# Patient Record
Sex: FEMALE | Race: OTHER | Employment: UNEMPLOYED | URBAN - METROPOLITAN AREA
[De-identification: names, ages, dates, MRNs, and addresses within clinical notes are randomized per-mention and may not be internally consistent; named-entity substitution may affect disease eponyms.]

---

## 2021-03-11 ENCOUNTER — HOSPITAL ENCOUNTER (OUTPATIENT)
Dept: LAB | Age: 41
Discharge: HOME | End: 2021-03-11

## 2021-03-11 PROCEDURE — 87624 HPV HI-RISK TYP POOLED RSLT: CPT

## 2021-03-11 PROCEDURE — 88175 CYTOPATH C/V AUTO FLUID REDO: CPT

## 2022-07-22 ENCOUNTER — HOSPITAL ENCOUNTER (OUTPATIENT)
Dept: PHYSICAL THERAPY | Age: 42
Discharge: HOME | End: 2022-07-22

## 2022-07-22 PROCEDURE — 97140 MANUAL THERAPY 1/> REGIONS: CPT | Performed by: PHYSICAL THERAPIST

## 2022-07-22 PROCEDURE — 97162 PT EVAL MOD COMPLEX 30 MIN: CPT | Performed by: PHYSICAL THERAPIST

## 2022-07-22 PROCEDURE — 97110 THERAPEUTIC EXERCISES: CPT | Performed by: PHYSICAL THERAPIST

## 2022-07-22 NOTE — THERAPY EVALUATION
Physical Therapy at [facility],   a part of  [facility]  [facility], [address]  Phone: [phone]  Fax: [phone]    Plan of Care/ Statement of Necessity for Physical Therapy Services 2-15    Patient name: Deb Gamble  : 1980  Provider#: [id number]  Referral source: Sky Choi MD      Medical/Treatment Diagnosis: Neck pain [M54.2]     Prior Hospitalization: see medical history     Comorbidities: None  Prior Level of Function: see initial eval  Medications: Verified on Patient Summary List    Start of Care: 2022      Onset Date:        The Plan of Care and following information is based on the information from the initial evaluation. Assessment/ key information: Patient presents with chronic L TMJ, neck, and low back pain with significant loss of ROM at all sites. Evaluation Complexity History MEDIUM  Complexity : 1-2 comorbidities / personal factors will impact the outcome/ POC ; Examination MEDIUM Complexity : 3 Standardized tests and measures addressing body structure, function, activity limitation and / or participation in recreation  ;Presentation MEDIUM Complexity : Evolving with changing characteristics  ; Clinical Decision Making MEDIUM Complexity : FOTO score of 26-74  Overall Complexity Rating: MEDIUM    Problem List: pain affecting function, decrease ROM, decrease strength, decrease ADL/ functional abilitiies, decrease activity tolerance, and decrease flexibility/ joint mobility   Treatment Plan may include any combination of the following: Therapeutic exercise, Therapeutic activities, Neuromuscular re-education, Physical agent/modality, Gait/balance training, Manual therapy, Patient education, Self Care training, Functional mobility training, Home safety training, and Other: dry needling  Patient / Family readiness to learn indicated by: asking questions, trying to perform skills, and interest  Persons(s) to be included in education: patient (P)  Barriers to Learning/Limitations: None  Patient Goal (s): I want to be able to chew without pain.   Patient Self Reported Health Status: good  Rehabilitation Potential: good    Short Term Goals: To be accomplished in 4 weeks:  Patient will be able to demonstrate at least 30 mm of mandibular opening without an audible click. Patient will be able to bend forward and tie her shoes with <5/10 low back pain. Patient will be able to demonstrate at least 30 degrees of cervical flexion AROM with <5/10 neck pain  Long Term Goals: To be accomplished in 12 weeks:  Patient will be able to demonstrate at 40 mm of mandibular opening without pain. Patient will be able to  a 10# objects from the floor without low back pain. Patient will be able to demonstrate >60 degrees of AROM cervical rotation to either direction without pain. Frequency / Duration: Patient to be seen 2 times per week for 12 weeks. Patient/ Caregiver education and instruction: self care, activity modification, and exercises    [x]  Plan of care has been reviewed with PTA    Kiara Agee, PT 7/22/2022     ________________________________________________________________________    I certify that the above Therapy Services are being furnished while the patient is under my care. I agree with the treatment plan and certify that this therapy is necessary.     Physician's Signature:____________________  Date:____________Time: _________      Junie Pandya MD

## 2022-07-22 NOTE — PROGRESS NOTES
PT INITIAL EVALUATION NOTE 2-15    Patient Name: Essence Pemberton  Date:2022  : 1980  [x]  Patient  Verified  Payor: [address] [place] [address] / Plan: [facility] / Product Type: Samanta /    In time:10:20 AM  Out time:11:20 AM  Total Treatment Time (min): 60  Visit #: 1     Treatment Area: Neck pain [M54.2]    SUBJECTIVE  Pain Level (0-10 scale): 10/10  Any medication changes, allergies to medications, adverse drug reactions, diagnosis change, or new procedure performed?: [] No    [x] Yes (see summary sheet for update)  Subjective:     L TMJ, neck, back pain  PLOF: No limitations with bending forward, turning head, chewing food  Mechanism of Injury: Insidious onset 3 years ago  Previous Treatment/Compliance: She has been told that her chronic back pain is arthritis. She saw her PCP one month ago and he referred her to PT. PMHx/Surgical Hx: No other PMH  Work Hx: n/a  Living Situation: lives in a two story home with family  Pt Goals: to reduce pain and improve mobility  Barriers: chronicity  Motivation: motivated  Substance use: none   Cognition: A & O x 4        OBJECTIVE/EXAMINATION  TMJ Evaluation    History  Chief Complaint: chewing, yawning  Pain Location: L TMJ   Constant vs intermittent: constant   Pain increases with: opening   Pain decreases with: rest  Onset (insidious vs trauma): insidious  Difficulty opening mouth?: yes  Clicking or popping?: yes  Jaw locking?: no  Pain with chewing, yawning, wide opening?: yes  Pain in ears/cheeks or ringing in ears?: no  Any injury to jaw, head or neck?: chronic neck pain  On hard or soft diet?: soft  Arthritis?: no  Previous history of TMJ problems?: no    Objective  TMJ resting position (toungue behind teeth with slightly apart): no  Mouth vs Nose Breathing: mouth  Posture: forward head  C-spine ROM:   Flexion: 30  Extension: 30  Rotation: R: 30 L: 45  SB: R: 30 L 30  C-spine strentgh: WNL  U/E ROM: WNL  U/E Strength:  WNL  Mandible ROM   (R) lateral deviation: 10   (L) lateral deviation: 20   Opening (4-5cm or 2 of pt's fingers=normal): 30  Resisted mandible motions: NT  Loading of TMJ (push posterior): NT  Dental Roll Bite   (R) roll: NT   (L) roll: NT  Palpation:   Posterior capsule (in ear, mouth open): TTP   Lateral capsule (external): TTP   Masseter: TTP   Pterygoids: NT   SCM: No   Scalenes: No   Suboccipital: TTP   Trapezius: TTP      Modality rationale: decrease pain and increase tissue extensibility to improve the patients ability to bend forward and chew food without pain   Min Type Additional Details    [] Estim: []Att   []Unatt        []TENS instruct                  []IFC  []Premod   []NMES                     []Other:  []w/US   []w/ice   []w/heat  Position:  Location:    []  Traction: [] Cervical       []Lumbar                       [] Prone          []Supine                       []Intermittent   []Continuous Lbs:  [] before manual  [] after manual  []w/heat    []  Ultrasound: []Continuous   [] Pulsed at:                            []1MHz   []3MHz Location:  W/cm2:    []  Paraffin         Location:  []w/heat   10 []  Ice     [x]  Heat  []  Ice massage Position: supine  Location: low back, neck, TMJ    []  Laser  []  Other: Position:  Location:    []  Vasopneumatic Device Pressure:       [] lo [] med [] hi   Temperature:    [x] Skin assessment post-treatment:  [x]intact []redness- no adverse reaction    []redness - adverse reaction:     10 min Therapeutic Exercise:  [x] See flow sheet :   Rationale: increase ROM, increase strength, and improve coordination to improve the patients ability to bend forward and chew without pain    15 min Manual Therapy:    Manual cervical traction  Suboccipital release  Grade III P/A mobilizations to entire c-spine  Passive stretching of B UT/LS   Rationale: decrease pain, increase ROM, and increase tissue extensibility  to improve the patients ability to bend forward and chew food without pain      With [] TE   [] TA   [] Neuro   [] SC   [] other: Patient Education: [x] Review HEP    [] Progressed/Changed HEP based on:   [] positioning   [] body mechanics   [] transfers   [] heat/ice application    [] other:        Other Objective/Functional Measures:   Pain Level (0-10 scale) post treatment: 5/10      ASSESSMENT:      [x]  See Plan of Chalo Martin, PT 7/22/2022

## 2022-07-26 ENCOUNTER — HOSPITAL ENCOUNTER (OUTPATIENT)
Dept: PHYSICAL THERAPY | Age: 42
Discharge: HOME | End: 2022-07-26

## 2022-07-26 PROCEDURE — 97110 THERAPEUTIC EXERCISES: CPT

## 2022-07-26 PROCEDURE — 97140 MANUAL THERAPY 1/> REGIONS: CPT

## 2022-07-26 NOTE — PROGRESS NOTES
PT DAILY TREATMENT NOTE 2-15    Patient Name: Orville Lara  Date:2022  : 1980  [x]  Patient  Verified  Payor: [address] [place] [address] / Plan: [facility] / Product Type: Erle Joyce /    In time: 11:00a  Out time: 12:00p  Total Treatment Time (min): 60  Visit #:  2    Treatment Area: Neck pain [M54.2]    SUBJECTIVE  Pain Level (0-10 scale): 7-8  Any medication changes, allergies to medications, adverse drug reactions, diagnosis change, or new procedure performed?: [x] No    [] Yes (see summary sheet for update)  Subjective functional status/changes:   [] No changes reported  Patient reports she has been performing the HEP, but has pain while doing them.     OBJECTIVE    Modality rationale: decrease pain and increase tissue extensibility to improve the patients ability to bend forwards and chew food without pain   Min Type Additional Details       [] Estim: []Att   []Unatt    []TENS instruct                  []IFC  []Premod   []NMES                     []Other:  []w/US   []w/ice   []w/heat  Position:  Location:       []  Traction: [] Cervical       []Lumbar                       [] Prone          []Supine                       []Intermittent   []Continuous Lbs:  [] before manual  [] after manual  []w/heat    []  Ultrasound: []Continuous   [] Pulsed                       at: []1MHz   []3MHz Location:  W/cm2:    [] Paraffin         Location:   []w/heat   5 []  Ice     [x]  Heat  []  Ice massage Position: supine  Location:back, neck, TMJ    []  Laser  []  Other: Position:  Location:      []  Vasopneumatic Device Pressure:       [] lo [] med [] hi   Temperature:      [x] Skin assessment post-treatment:  [x]intact []redness- no adverse reaction    []redness - adverse reaction:         35 min Therapeutic Exercise:  [x] See flow sheet :   Rationale: increase ROM to improve the patients ability to bend forward, look over shoulder and chew without pain    20 min Manual Therapy:  gentle MFR L SCM, masseter, UT, cervical paraspinals   Rationale: decrease pain, increase ROM, increase tissue extensibility, and decrease trigger points  to improve the patients ability to bend forward, look over shoulder and chew without pain          With   [] TE   [] TA   [] Neuro   [] SC   [] other: Patient Education: [x] Review HEP    [] Progressed/Changed HEP based on:   [] positioning   [] body mechanics   [] transfers   [] heat/ice application    [] other:      Other Objective/Functional Measures: TTP L SCM and UT, decreased tissue turgor masseter and SCM post manual    Pt reports improved symptoms after manual today     Pain Level (0-10 scale) post treatment: 4    ASSESSMENT/Changes in Function:   Patient requires min to mod verbal cues for pain free ROM and exercise performance. Patient unable to tolerate UT and levator stretch on right secondary to increased pain felt on left, good tolerance and decreased pain when left stretching. Patient will continue to benefit from skilled PT services to modify and progress therapeutic interventions, address functional mobility deficits, address ROM deficits, analyze and address soft tissue restrictions, and analyze and cue movement patterns to attain remaining goals. []  See Plan of Care  []  See progress note/recertification  []  See Discharge Summary         Progress towards goals / Updated goals:  Patient with good tolerance for all interventions with some relief after manual. Patient making slow progress towards goals at this time and may benefit from addition of dry needling if minimal progress seen over next several visits.     PLAN  [x]  Upgrade activities as tolerated     [x]  Continue plan of care  [x]  Update interventions per flow sheet       []  Discharge due to:_  []  Other:_      Sam Mariscla, TATI 7/26/2022

## 2022-07-29 ENCOUNTER — HOSPITAL ENCOUNTER (OUTPATIENT)
Dept: PHYSICAL THERAPY | Age: 42
Discharge: HOME | End: 2022-07-29

## 2022-07-29 PROCEDURE — 97110 THERAPEUTIC EXERCISES: CPT | Performed by: PHYSICAL MEDICINE & REHABILITATION

## 2022-07-29 PROCEDURE — 97140 MANUAL THERAPY 1/> REGIONS: CPT | Performed by: PHYSICAL MEDICINE & REHABILITATION

## 2022-07-29 NOTE — PROGRESS NOTES
PT DAILY TREATMENT NOTE 2-15    Patient Name: Emily Jerez  Date:2022  : 1980  [x]  Patient  Verified  Payor: [address] [place] [address] / Plan: [facility] / Product Type: Dori Kobs /    In time: 10:00a  Out time: 11:00a  Total Treatment Time (min): 60  Visit #:  3    Treatment Area: Neck pain [M54.2]    SUBJECTIVE  Pain Level (0-10 scale): 7  Any medication changes, allergies to medications, adverse drug reactions, diagnosis change, or new procedure performed?: [x] No    [] Yes (see summary sheet for update)  Subjective functional status/changes:   [] No changes reported  Patient reports she has been performing the HEP, but has pain while doing them.     OBJECTIVE    Modality rationale: decrease pain and increase tissue extensibility to improve the patients ability to bend forwards and chew food without pain   Min Type Additional Details       [] Estim: []Att   []Unatt    []TENS instruct                  []IFC  []Premod   []NMES                     []Other:  []w/US   []w/ice   []w/heat  Position:  Location:       []  Traction: [] Cervical       []Lumbar                       [] Prone          []Supine                       []Intermittent   []Continuous Lbs:  [] before manual  [] after manual  []w/heat    []  Ultrasound: []Continuous   [] Pulsed                       at: []1MHz   []3MHz Location:  W/cm2:    [] Paraffin         Location:   []w/heat   10 []  Ice     [x]  Heat  []  Ice massage Position: supine  Location:back, neck, TMJ    []  Laser  []  Other: Position:  Location:      []  Vasopneumatic Device Pressure:       [] lo [] med [] hi   Temperature:      [x] Skin assessment post-treatment:  [x]intact []redness- no adverse reaction    []redness - adverse reaction:         35 min Therapeutic Exercise:  [x] See flow sheet :   Rationale: increase ROM to improve the patients ability to bend forward, look over shoulder and chew without pain    15 min Manual Therapy:  SOR, Cervical traction, UT stretch, TPR to L UT/Lev   Rationale: decrease pain, increase ROM, increase tissue extensibility, and decrease trigger points  to improve the patients ability to bend forward, look over shoulder and chew without pain          With   [] TE   [] TA   [] Neuro   [] SC   [] other: Patient Education: [x] Review HEP    [] Progressed/Changed HEP based on:   [] positioning   [] body mechanics   [] transfers   [] heat/ice application    [] other:      Other Objective/Functional Measures:   Max TTP along along L UT/Lev and L suboccipital    Pain Level (0-10 scale) post treatment: 5    ASSESSMENT/Changes in Function:     Patient will continue to benefit from skilled PT services to modify and progress therapeutic interventions, address functional mobility deficits, address ROM deficits, analyze and address soft tissue restrictions, and analyze and cue movement patterns to attain remaining goals. []  See Plan of Care  []  See progress note/recertification  []  See Discharge Summary         Progress towards goals / Updated goals:  Patient is making slow progress towards goals due to high levels of pain at multiple sites. Will continue to progress slowly as tolerated.     PLAN  [x]  Upgrade activities as tolerated     [x]  Continue plan of care  [x]  Update interventions per flow sheet       []  Discharge due to:_  []  Other:_      Argelia Forward, PTA, OPTA, CPT  7/29/2022

## 2022-08-01 ENCOUNTER — HOSPITAL ENCOUNTER (EMERGENCY)
Age: 42
Discharge: HOME | End: 2022-08-01
Attending: STUDENT IN AN ORGANIZED HEALTH CARE EDUCATION/TRAINING PROGRAM

## 2022-08-01 ENCOUNTER — APPOINTMENT (OUTPATIENT)
Dept: GENERAL RADIOLOGY | Age: 42
End: 2022-08-01
Attending: STUDENT IN AN ORGANIZED HEALTH CARE EDUCATION/TRAINING PROGRAM

## 2022-08-01 VITALS
WEIGHT: 114 LBS | RESPIRATION RATE: 16 BRPM | BODY MASS INDEX: 21.52 KG/M2 | DIASTOLIC BLOOD PRESSURE: 91 MMHG | TEMPERATURE: 97.7 F | HEIGHT: 61 IN | OXYGEN SATURATION: 100 % | HEART RATE: 69 BPM | SYSTOLIC BLOOD PRESSURE: 150 MMHG

## 2022-08-01 DIAGNOSIS — R07.9 CHEST PAIN, UNSPECIFIED TYPE: Primary | ICD-10-CM

## 2022-08-01 DIAGNOSIS — R03.0 ELEVATED BLOOD PRESSURE READING: ICD-10-CM

## 2022-08-01 LAB
ALBUMIN SERPL-MCNC: 3.8 G/DL (ref 3.5–5)
ALBUMIN/GLOB SERPL: 0.8 {RATIO} (ref 1.1–2.2)
ALP SERPL-CCNC: 106 U/L (ref 45–117)
ALT SERPL-CCNC: 41 U/L (ref 12–78)
ANION GAP SERPL CALC-SCNC: 4 MMOL/L (ref 5–15)
AST SERPL-CCNC: 34 U/L (ref 15–37)
BASOPHILS # BLD: 0 K/UL (ref 0–0.1)
BASOPHILS NFR BLD: 0 % (ref 0–1)
BILIRUB SERPL-MCNC: 0.5 MG/DL (ref 0.2–1)
BUN SERPL-MCNC: 6 MG/DL (ref 6–20)
BUN/CREAT SERPL: 10 (ref 12–20)
CALCIUM SERPL-MCNC: 9 MG/DL (ref 8.5–10.1)
CHLORIDE SERPL-SCNC: 110 MMOL/L (ref 97–108)
CO2 SERPL-SCNC: 24 MMOL/L (ref 21–32)
CREAT SERPL-MCNC: 0.6 MG/DL (ref 0.55–1.02)
DIFFERENTIAL METHOD BLD: ABNORMAL
EOSINOPHIL # BLD: 0 K/UL (ref 0–0.4)
EOSINOPHIL NFR BLD: 0 % (ref 0–7)
ERYTHROCYTE [DISTWIDTH] IN BLOOD BY AUTOMATED COUNT: 14 % (ref 11.5–14.5)
GLOBULIN SER CALC-MCNC: 5 G/DL (ref 2–4)
GLUCOSE SERPL-MCNC: 111 MG/DL (ref 65–100)
HCT VFR BLD AUTO: 39.5 % (ref 35–47)
HGB BLD-MCNC: 12.5 G/DL (ref 11.5–16)
IMM GRANULOCYTES # BLD AUTO: 0 K/UL (ref 0–0.04)
IMM GRANULOCYTES NFR BLD AUTO: 0 % (ref 0–0.5)
LYMPHOCYTES # BLD: 0.7 K/UL (ref 0.8–3.5)
LYMPHOCYTES NFR BLD: 9 % (ref 12–49)
MCH RBC QN AUTO: 26.3 PG (ref 26–34)
MCHC RBC AUTO-ENTMCNC: 31.6 G/DL (ref 30–36.5)
MCV RBC AUTO: 83.2 FL (ref 80–99)
MONOCYTES # BLD: 0.1 K/UL (ref 0–1)
MONOCYTES NFR BLD: 1 % (ref 5–13)
NEUTS SEG # BLD: 7.2 K/UL (ref 1.8–8)
NEUTS SEG NFR BLD: 90 % (ref 32–75)
NRBC # BLD: 0 K/UL (ref 0–0.01)
NRBC BLD-RTO: 0 PER 100 WBC
PLATELET # BLD AUTO: 329 K/UL (ref 150–400)
PMV BLD AUTO: 10.6 FL (ref 8.9–12.9)
POTASSIUM SERPL-SCNC: 3.3 MMOL/L (ref 3.5–5.1)
PROT SERPL-MCNC: 8.8 G/DL (ref 6.4–8.2)
RBC # BLD AUTO: 4.75 M/UL (ref 3.8–5.2)
RBC MORPH BLD: ABNORMAL
SODIUM SERPL-SCNC: 138 MMOL/L (ref 136–145)
TROPONIN-HIGH SENSITIVITY: <4 NG/L (ref 0–51)
WBC # BLD AUTO: 8 K/UL (ref 3.6–11)

## 2022-08-01 PROCEDURE — 71046 X-RAY EXAM CHEST 2 VIEWS: CPT

## 2022-08-01 PROCEDURE — 36415 COLL VENOUS BLD VENIPUNCTURE: CPT

## 2022-08-01 PROCEDURE — 84484 ASSAY OF TROPONIN QUANT: CPT

## 2022-08-01 PROCEDURE — 85025 COMPLETE CBC W/AUTO DIFF WBC: CPT

## 2022-08-01 PROCEDURE — 99285 EMERGENCY DEPT VISIT HI MDM: CPT

## 2022-08-01 PROCEDURE — 93005 ELECTROCARDIOGRAM TRACING: CPT

## 2022-08-01 PROCEDURE — 80053 COMPREHEN METABOLIC PANEL: CPT

## 2022-08-01 NOTE — ED TRIAGE NOTES
Patient with CP x3 days with pain radiating down left arm today, sent by [facility] for HTN in office and CP.      1 nitro and 324 Aspirin given by EMS     Patient is Slovenian speaking

## 2022-08-01 NOTE — ED PROVIDER NOTES
Pt with PMHx of GERD, Anxiety and Lumbar radiculopathy. Brought by EMS d/t high BP and CP while she was at [facility] where she got an injection for back pain. Pt mentioned she has been experiencing CP on exertion for the past few months. This is localized in L side, 7/10 in intensity, mainly while doing home shores. Dull type, radiate to L arm and is accompanied by lightlessness, palpitations and blurred vision. Resolved by resting. She mentioned she was not anxious today and has received those injections before. Seems like high BP was before the shot. Denies fever, N/V, no abdominal pain. EMS gave her Nitro x1 and  mgx 1, mentioned it helped. No past medical history on file. No past surgical history on file. No family history on file. Social History     Socioeconomic History    Marital status: UNKNOWN     Spouse name: Not on file    Number of children: Not on file    Years of education: Not on file    Highest education level: Not on file   Occupational History    Not on file   Tobacco Use    Smoking status: Not on file    Smokeless tobacco: Not on file   Substance and Sexual Activity    Alcohol use: Not on file    Drug use: Not on file    Sexual activity: Not on file   Other Topics Concern    Not on file   Social History Narrative    Not on file     Social Determinants of Health     Financial Resource Strain: Not on file   Food Insecurity: Not on file   Transportation Needs: Not on file   Physical Activity: Not on file   Stress: Not on file   Social Connections: Not on file   Intimate Partner Violence: Not on file   Housing Stability: Not on file         ALLERGIES: Penicillins    Review of Systems   Constitutional:  Negative for fatigue and fever. HENT:  Positive for sinus pressure. Cardiovascular:  Positive for chest pain and palpitations. Negative for leg swelling. Gastrointestinal:  Negative for abdominal pain. Genitourinary:  Negative for dysuria and flank pain. Musculoskeletal:  Positive for back pain. Skin:  Negative for pallor. Neurological:  Positive for light-headedness. Negative for syncope. Psychiatric/Behavioral:  Negative for confusion. Vitals:    08/01/22 1300 08/01/22 1427   BP: (!) 155/75 (!) 150/91   Pulse: 69 69   Resp: 18 16   Temp: 97.7 °F (36.5 °C)    SpO2: 99% 100%   Weight: 51.7 kg (114 lb)    Height: 5' 1\" (1.549 m)             Physical Exam  Constitutional:       General: She is not in acute distress. HENT:      Head: Normocephalic and atraumatic. Eyes:      Extraocular Movements: Extraocular movements intact. Cardiovascular:      Rate and Rhythm: Normal rate and regular rhythm. Heart sounds: Normal heart sounds. No murmur heard. Pulmonary:      Breath sounds: Normal breath sounds. No wheezing, rhonchi or rales. Chest:      Chest wall: No tenderness. Abdominal:      General: Bowel sounds are normal.      Palpations: Abdomen is soft. Musculoskeletal:         General: Normal range of motion. Cervical back: Normal range of motion. Skin:     General: Skin is warm and dry. Capillary Refill: Capillary refill takes less than 2 seconds. Neurological:      General: No focal deficit present. Mental Status: She is alert. Psychiatric:         Mood and Affect: Mood normal.        MDM  Number of Diagnoses or Management Options  Diagnosis management comments: Pt symptoms may possibly be related to MSK in nature or undiagnosed HTN or arrhythmia. Heart Score low. ACS work up negative and CXR, CBC and CMP unremarkable. Pt has remain stable and w/o distress during this ED visit. Will sent home w/ close follow up w/ PCP to monitor BP and consider treatment if appropriate. All above discussed w/ pt who is agreeable. Pt seen and discussed w/ Dr. Gillespie (Attending Physician).               Procedures

## 2022-08-02 LAB
ATRIAL RATE: 68 BPM
CALCULATED P AXIS, ECG09: 51 DEGREES
CALCULATED R AXIS, ECG10: -16 DEGREES
CALCULATED T AXIS, ECG11: -21 DEGREES
DIAGNOSIS, 93000: NORMAL
P-R INTERVAL, ECG05: 122 MS
Q-T INTERVAL, ECG07: 400 MS
QRS DURATION, ECG06: 82 MS
QTC CALCULATION (BEZET), ECG08: 425 MS
VENTRICULAR RATE, ECG03: 68 BPM

## 2022-08-05 ENCOUNTER — APPOINTMENT (OUTPATIENT)
Dept: PHYSICAL THERAPY | Age: 42
End: 2022-08-05

## 2022-08-10 ENCOUNTER — HOSPITAL ENCOUNTER (OUTPATIENT)
Dept: PHYSICAL THERAPY | Age: 42
Discharge: HOME | End: 2022-08-10

## 2022-08-10 PROCEDURE — 97140 MANUAL THERAPY 1/> REGIONS: CPT | Performed by: PHYSICAL THERAPIST

## 2022-08-10 PROCEDURE — 97110 THERAPEUTIC EXERCISES: CPT | Performed by: PHYSICAL THERAPIST

## 2022-08-10 NOTE — PROGRESS NOTES
PT DAILY TREATMENT NOTE 2-15    Patient Name: Leilani Love  Date:8/10/2022  : 1980  [x]  Patient  Verified  Payor: [address] [place] [address] / Plan: [facility] / Product Type: Betina Poet /    In time: 12:00p Out time: 12:55p  Total Treatment Time (min): 55  Visit #:  4    Treatment Area: Neck pain [M54.2]    SUBJECTIVE  Pain Level (0-10 scale): 6  Any medication changes, allergies to medications, adverse drug reactions, diagnosis change, or new procedure performed?: [x] No    [] Yes (see summary sheet for update)  Subjective functional status/changes:   [] No changes reported  Patient returns to the clinic following a 2 week absence due to other health complications. Her BP was very elevated for about a week and she went to a local ER. They cleared her of any cardiac issues and also gave an injection to the lumbar spine. That helped significantly and she has little back pain. Her L TMJ and neck pain is about the same as on her last visit. She finds that the [address] helps, but she still has difficulty turning her head in either direction.     OBJECTIVE    Modality rationale: decrease pain and increase tissue extensibility to improve the patients ability to bend forwards and chew food without pain   Min Type Additional Details       [] Estim: []Att   []Unatt    []TENS instruct                  []IFC  []Premod   []NMES                     []Other:  []w/US   []w/ice   []w/heat  Position:  Location:       []  Traction: [] Cervical       []Lumbar                       [] Prone          []Supine                       []Intermittent   []Continuous Lbs:  [] before manual  [] after manual  []w/heat    []  Ultrasound: []Continuous   [] Pulsed                       at: []1MHz   []3MHz Location:  W/cm2:    [] Paraffin         Location:   []w/heat   10 []  Ice     [x]  Heat  []  Ice massage Position: supine  Location:back, neck, TMJ    []  Laser  []  Other: Position:  Location:      []  Vasopneumatic Device Pressure:       [] lo [] med [] hi   Temperature:      [x] Skin assessment post-treatment:  [x]intact []redness- no adverse reaction    []redness - adverse reaction:         25 min Therapeutic Exercise:  [x] See flow sheet :   Rationale: increase ROM to improve the patients ability to bend forward, look over shoulder and chew without pain    20 min Manual Therapy:    SOR  TPR to L SCM, scalenes, L masseter  Grade III rotary mobilizations to C2-C4   Rationale: decrease pain, increase ROM, increase tissue extensibility, and decrease trigger points  to improve the patients ability to bend forward, look over shoulder and chew without pain          With   [] TE   [] TA   [] Neuro   [] SC   [] other: Patient Education: [x] Review HEP    [] Progressed/Changed HEP based on:   [] positioning   [] body mechanics   [] transfers   [] heat/ice application    [] other:      Other Objective/Functional Measures:   Continues to be very TTP along anterior neck musculature on L  Strong pain relief with mobilizations to L upper c-spine    Pain Level (0-10 scale) post treatment: 3, \"better\"    ASSESSMENT/Changes in Function:     Patient will continue to benefit from skilled PT services to modify and progress therapeutic interventions, address functional mobility deficits, address ROM deficits, analyze and address soft tissue restrictions, and analyze and cue movement patterns to attain remaining goals. []  See Plan of Care  []  See progress note/recertification  []  See Discharge Summary         Progress towards goals / Updated goals:  Slow overall progress towards functional goals due to patient's PMH and significant pain levels. PLAN  [x]  Upgrade activities as tolerated     [x]  Continue plan of care  [x]  Update interventions per flow sheet       []  Discharge due to:_  []  Other:_      Olayinka Pendleton, PT , DPT, OCS, Cert.  DN   8/10/2022

## 2022-08-12 ENCOUNTER — HOSPITAL ENCOUNTER (OUTPATIENT)
Dept: PHYSICAL THERAPY | Age: 42
Discharge: HOME | End: 2022-08-12

## 2022-08-12 PROCEDURE — 97110 THERAPEUTIC EXERCISES: CPT

## 2022-08-12 PROCEDURE — 97140 MANUAL THERAPY 1/> REGIONS: CPT

## 2022-08-12 NOTE — PROGRESS NOTES
PT DAILY TREATMENT NOTE 2-15    Patient Name: Molly Walker  Date:2022  : 1980  [x]  Patient  Verified  Payor: [address] [place] [address] / Plan: [facility] / Product Type: Melody Graham /    In time: 11:10a  Out time: 12:00  Total Treatment Time (min): 50  Visit #:  5    Treatment Area: Neck pain [M54.2]    SUBJECTIVE  Pain Level (0-10 scale): 5  Any medication changes, allergies to medications, adverse drug reactions, diagnosis change, or new procedure performed?: [x] No    [] Yes (see summary sheet for update)  Subjective functional status/changes:   [] No changes reported  Patient states pain in neck is a \"little bit less than before. \" Patient reports no LBP this session, but injection in lower back makes her L leg feel numb.      OBJECTIVE    Modality rationale: decrease pain and increase tissue extensibility to improve the patients ability to bend forwards and chew food without pain   Min Type Additional Details       [] Estim: []Att   []Unatt    []TENS instruct                  []IFC  []Premod   []NMES                     []Other:  []w/US   []w/ice   []w/heat  Position:  Location:       []  Traction: [] Cervical       []Lumbar                       [] Prone          []Supine                       []Intermittent   []Continuous Lbs:  [] before manual  [] after manual  []w/heat    []  Ultrasound: []Continuous   [] Pulsed                       at: []1MHz   []3MHz Location:  W/cm2:    [] Paraffin         Location:   []w/heat   10 []  Ice     [x]  Heat  []  Ice massage Position: supine  Location: back, neck, TMJ    []  Laser  []  Other: Position:  Location:      []  Vasopneumatic Device Pressure:       [] lo [] med [] hi   Temperature:      [x] Skin assessment post-treatment:  [x]intact []redness- no adverse reaction    []redness - adverse reaction:         25 min Therapeutic Exercise:  [] See flow sheet :   Rationale: increase ROM to improve the patients ability to bend forward ,look over shoulder and chew without pain    15 min Manual Therapy:  SOR, C/S traction, UT stretch, TPR L UT, Grade III C2-C4 mobs   Rationale: decrease pain, increase ROM, increase tissue extensibility, and decrease trigger points  to improve the patients ability to bend forward, look over shoulder and chew without pain        With   [] TE   [] TA   [] Neuro   [] SC   [] other: Patient Education: [x] Review HEP    [] Progressed/Changed HEP based on:   [] positioning   [] body mechanics   [] transfers   [] heat/ice application    [] other:      Other Objective/Functional Measures:    TTP  L UT    Pain Level (0-10 scale) post treatment: 3    ASSESSMENT/Changes in Function:     Patient will continue to benefit from skilled PT services to modify and progress therapeutic interventions, address functional mobility deficits, address ROM deficits, analyze and address soft tissue restrictions, analyze and cue movement patterns, analyze and modify body mechanics/ergonomics, and assess and modify postural abnormalities to attain remaining goals. [x]  See Plan of Care  []  See progress note/recertification  []  See Discharge Summary         Progress towards goals / Updated goals:  Patient with limited progress towards goals this session secondary to pain.      PLAN  []  Upgrade activities as tolerated     []  Continue plan of care  []  Update interventions per flow sheet       []  Discharge due to:_  []  Other:_      Reggie Butler 8/12/2022    Doris Szymanski, TATI  TE: 2  MT: 1

## 2022-08-17 ENCOUNTER — HOSPITAL ENCOUNTER (OUTPATIENT)
Dept: PHYSICAL THERAPY | Age: 42
Discharge: HOME | End: 2022-08-17

## 2022-08-17 PROCEDURE — 97014 ELECTRIC STIMULATION THERAPY: CPT | Performed by: PHYSICAL THERAPIST

## 2022-08-17 PROCEDURE — 97110 THERAPEUTIC EXERCISES: CPT | Performed by: PHYSICAL THERAPIST

## 2022-08-17 PROCEDURE — 97140 MANUAL THERAPY 1/> REGIONS: CPT | Performed by: PHYSICAL THERAPIST

## 2022-08-17 NOTE — PROGRESS NOTES
PT DAILY TREATMENT NOTE 2-15    Patient Name: Leilani Love  Date:2022  : 1980  [x]  Patient  Verified  Payor: [address] / Plan: [facility] / Product Type: Betina Poet /    In time: 12:00p Out time: 12:50p  Total Treatment Time (min): 50  Visit #:  6    Treatment Area: Neck pain [M54.2]    SUBJECTIVE  Pain Level (0-10 scale): 4  Any medication changes, allergies to medications, adverse drug reactions, diagnosis change, or new procedure performed?: [x] No    [] Yes (see summary sheet for update)  Subjective functional status/changes:   [] No changes reported  Patient reports improved pain at both her neck and the L TMJ and is happy with her progress.     OBJECTIVE    Modality rationale: decrease pain and increase tissue extensibility to improve the patients ability to bend forwards and chew food without pain   Min Type Additional Details       [] Estim: []Att   []Unatt    []TENS instruct                  []IFC  []Premod   []NMES                     []Other:  []w/US   []w/ice   []w/heat  Position:  Location:       []  Traction: [] Cervical       []Lumbar                       [] Prone          []Supine                       []Intermittent   []Continuous Lbs:  [] before manual  [] after manual  []w/heat    []  Ultrasound: []Continuous   [] Pulsed                       at: []1MHz   []3MHz Location:  W/cm2:    [] Paraffin         Location:   []w/heat   10 []  Ice     [x]  Heat  []  Ice massage Position: supine  Location: back, neck, TMJ    []  Laser  []  Other: Position:  Location:      []  Vasopneumatic Device Pressure:       [] lo [] med [] hi   Temperature:      [x] Skin assessment post-treatment:  [x]intact []redness- no adverse reaction    []redness - adverse reaction:         25 min Therapeutic Exercise:  [] See flow sheet :   Rationale: increase ROM to improve the patients ability to bend forward ,look over shoulder and chew without pain    15 min Manual Therapy:  SOR, C/S traction, UT stretch, TPR L UT, Grade III C2-C4 mobs   Rationale: decrease pain, increase ROM, increase tissue extensibility, and decrease trigger points  to improve the patients ability to bend forward, look over shoulder and chew without pain        With   [] TE   [] TA   [] Neuro   [] SC   [] other: Patient Education: [x] Review HEP    [] Progressed/Changed HEP based on:   [] positioning   [] body mechanics   [] transfers   [] heat/ice application    [] other:      Other Objective/Functional Measures:    Greatest relief came from cervical mobilizations to the c-spine. Pain Level (0-10 scale) post treatment: 3    ASSESSMENT/Changes in Function:     Patient will continue to benefit from skilled PT services to modify and progress therapeutic interventions, address functional mobility deficits, address ROM deficits, analyze and address soft tissue restrictions, analyze and cue movement patterns, analyze and modify body mechanics/ergonomics, and assess and modify postural abnormalities to attain remaining goals. [x]  See Plan of Care  []  See progress note/recertification  []  See Discharge Summary         Progress towards goals / Updated goals:  Greater progress is being seen these past two weeks, but continued limitations with both cervical and TMJ ROM. PLAN  []  Upgrade activities as tolerated     []  Continue plan of care  []  Update interventions per flow sheet       []  Discharge due to:_  []  Other:_      Lubna Ware, PT , DPT, OCS, Cert.  DN   8/17/2022

## 2022-08-19 ENCOUNTER — HOSPITAL ENCOUNTER (OUTPATIENT)
Dept: PHYSICAL THERAPY | Age: 42
Discharge: HOME | End: 2022-08-19

## 2022-08-19 PROCEDURE — 97110 THERAPEUTIC EXERCISES: CPT

## 2022-08-19 PROCEDURE — 97140 MANUAL THERAPY 1/> REGIONS: CPT

## 2022-08-24 ENCOUNTER — HOSPITAL ENCOUNTER (OUTPATIENT)
Dept: PHYSICAL THERAPY | Age: 42
Discharge: HOME | End: 2022-08-24

## 2022-08-24 PROCEDURE — 97110 THERAPEUTIC EXERCISES: CPT

## 2022-08-24 PROCEDURE — 97140 MANUAL THERAPY 1/> REGIONS: CPT

## 2022-08-24 NOTE — PROGRESS NOTES
PT DAILY TREATMENT NOTE - [facility] 2-15    Patient Name: Nina Pack  Date:2022  : 1980  [x]  Patient  Verified  Payor: [address] [place] [address] / Plan: [facility] / Product Type: [employer] /    In time:12:20  Out time:1:15  Total Treatment Time (min): 55  Total Timed Codes (min): 45  1:1 Treatment Time ( [address] only): -   Visit #: 8      Treatment Area: Neck pain [M54.2]    SUBJECTIVE  Pain Level (0-10 scale): 5  Any medication changes, allergies to medications, adverse drug reactions, diagnosis change, or new procedure performed?: [x] No    [] Yes (see summary sheet for update)  Subjective functional status/changes:   [] No changes reported  Pt reports that her neck pain is feeling better each time she comes in.      OBJECTIVE    Modality rationale: decrease pain and increase tissue extensibility to improve the patients ability to bend forwards and chew food without pain   Min Type Additional Details    [] Estim: []Att   []Unatt        []TENS instruct                  []IFC  []Premod   []NMES                     []Other:  []w/US   []w/ice   []w/heat  Position:  Location:    []  Traction: [] Cervical       []Lumbar                       [] Prone          []Supine                       []Intermittent   []Continuous Lbs:  [] before manual  [] after manual  []w/heat    []  Ultrasound: []Continuous   [] Pulsed at:                           []1MHz   []3MHz Location:  W/cm2:    [] Paraffin         Location:   []w/heat   10 []  Ice     [x]  Heat  []  Ice massage Position: supine  Location: cervical    []  Laser  []  Other: Position:  Location:      []  Vasopneumatic Device Pressure:       [] lo [] med [] hi   Temperature:      [x] Skin assessment post-treatment:  [x]intact []redness- no adverse reaction    []redness - adverse reaction:     25 min Therapeutic Exercise:  [x] See flow sheet :   Rationale: increase ROM to improve patients ability to bend forward ,look over shoulder and chew without pain    20 min Manual Therapy:  Sub-Occipital Release, C/S traction, UT stretch, Trigger Point Release L UT, Grade III A/P and rotational mobs C3-4   Rationale: decrease pain, increase ROM, increase tissue extensibility, and decrease trigger points to improve the patients ability to bend forward ,look over shoulder and chew without pain          With   [x] TE   [] TA   [] neuro   [] other: Patient Education: [x] Review HEP    [] Progressed/Changed HEP based on:   [] positioning   [] body mechanics   [] transfers   [] heat/ice application    [] other:      Other Objective/Functional Measures: Pain radiating posterior/lateral shoulder to fingers on L when performing manual therapy  Notable soft tissue restrictions along L UT, SCM, and LS. Pain Level (0-10 scale) post treatment: 5    ASSESSMENT/Changes in Function:     Patient will continue to benefit from skilled PT services to modify and progress therapeutic interventions, address functional mobility deficits, address ROM deficits, address strength deficits, analyze and address soft tissue restrictions, analyze and cue movement patterns, analyze and modify body mechanics/ergonomics, and assess and modify postural abnormalities to attain remaining goals. []  See Plan of Care  []  See progress note/recertification  []  See Discharge Summary         Progress towards goals / Updated goals:  No progress during todays visit. Progress was limited by pt experiencing elevated pain in L shoulder blade that radiated to L hand during passive and active stretching of L UT and during manual therapy. Pt to continue therapy in order to reach remaining goals. Progress to tolerance.      PLAN  [x]  Upgrade activities as tolerated     [x]  Continue plan of care  [x]  Update interventions per flow sheet       []  Discharge due to:_  []  Other:_      ADITYA Hankins 8/24/2022     2 - TE  1 - NM

## 2022-08-26 ENCOUNTER — HOSPITAL ENCOUNTER (OUTPATIENT)
Dept: PHYSICAL THERAPY | Age: 42
Discharge: HOME | End: 2022-08-26

## 2022-08-26 PROCEDURE — 97140 MANUAL THERAPY 1/> REGIONS: CPT

## 2022-08-26 PROCEDURE — 97110 THERAPEUTIC EXERCISES: CPT

## 2022-08-26 NOTE — PROGRESS NOTES
PT DAILY TREATMENT NOTE - [facility] 2-15    Patient Name: Dino Grover  Date:2022  : 1980  [x]  Patient  Verified  Payor: [address] [place] [address] / Plan: [facility] / Product Type: [employer] /    In time:12:15p  Out time:1:15p  Total Treatment Time (min): 60  Total Timed Codes (min): 45  1:1 Treatment Time ( [address] only): -   Visit #: 9      Treatment Area: Neck pain [M54.2]    SUBJECTIVE  Pain Level (0-10 scale): 5  Any medication changes, allergies to medications, adverse drug reactions, diagnosis change, or new procedure performed?: [x] No    [] Yes (see summary sheet for update)  Subjective functional status/changes:   [] No changes reported  Patient reports she has had a little more soreness the day after last visit, but felt better yesterday. Patient states she has a little more ROM since yesterday.     OBJECTIVE    Modality rationale: decrease pain and increase tissue extensibility to improve the patients ability to bend forwards and chew food without pain   Min Type Additional Details    [] Estim: []Att   []Unatt        []TENS instruct                  []IFC  []Premod   []NMES                     []Other:  []w/US   []w/ice   []w/heat  Position:  Location:    []  Traction: [] Cervical       []Lumbar                       [] Prone          []Supine                       []Intermittent   []Continuous Lbs:  [] before manual  [] after manual  []w/heat    []  Ultrasound: []Continuous   [] Pulsed at:                           []1MHz   []3MHz Location:  W/cm2:    [] Paraffin         Location:   []w/heat   10 []  Ice     [x]  Heat  []  Ice massage Position: supine  Location: cervical    []  Laser  []  Other: Position:  Location:      []  Vasopneumatic Device Pressure:       [] lo [] med [] hi   Temperature:      [x] Skin assessment post-treatment:  [x]intact []redness- no adverse reaction    []redness - adverse reaction:     30 min Therapeutic Exercise:  [x] See flow sheet :   Rationale: increase ROM to improve patients ability to bend forward ,look over shoulder and chew without pain    20 min Manual Therapy:  MFR UT, cervical paraspinals, cervical A/P mobs, SOR with manual traction, UT stretch   Rationale: decrease pain, increase ROM, increase tissue extensibility, and decrease trigger points to improve the patients ability to bend forward ,look over shoulder and chew without pain          With   [x] TE   [] TA   [] neuro   [] other: Patient Education: [x] Review HEP    [] Progressed/Changed HEP based on:   [] positioning   [] body mechanics   [] transfers   [] heat/ice application    [] other:      Other Objective/Functional Measures: no increased pain with AROM with verbal cues for pain free range, pt limited with cervical extension and left rotation due to pain,      Pain Level (0-10 scale) post treatment: 3    ASSESSMENT/Changes in Function:     Patient will continue to benefit from skilled PT services to modify and progress therapeutic interventions, address functional mobility deficits, address ROM deficits, address strength deficits, analyze and address soft tissue restrictions, analyze and cue movement patterns, analyze and modify body mechanics/ergonomics, and assess and modify postural abnormalities to attain remaining goals. []  See Plan of Care  []  See progress note/recertification  []  See Discharge Summary         Progress towards goals / Updated goals:  Patient making slow progress towards goals secondary to co-morbidities and high levels of pain. Patient with poor carryover of symptom relief, but tolerated advanced nerve glides well today.     PLAN  [x]  Upgrade activities as tolerated     [x]  Continue plan of care  [x]  Update interventions per flow sheet       []  Discharge due to:_  []  Other:_      Elsy Cole, TATI 8/26/2022

## 2022-09-02 ENCOUNTER — HOSPITAL ENCOUNTER (OUTPATIENT)
Dept: PHYSICAL THERAPY | Age: 42
Discharge: HOME | End: 2022-09-02

## 2022-09-02 PROCEDURE — 97110 THERAPEUTIC EXERCISES: CPT

## 2022-09-02 PROCEDURE — 97140 MANUAL THERAPY 1/> REGIONS: CPT

## 2022-09-02 PROCEDURE — 97014 ELECTRIC STIMULATION THERAPY: CPT

## 2022-09-23 ENCOUNTER — HOSPITAL ENCOUNTER (OUTPATIENT)
Dept: PHYSICAL THERAPY | Age: 42
Discharge: HOME | End: 2022-09-23

## 2022-09-23 ENCOUNTER — TRANSCRIBE ORDER (OUTPATIENT)
Dept: SCHEDULING | Age: 42
End: 2022-09-23

## 2022-09-23 ENCOUNTER — APPOINTMENT (OUTPATIENT)
Dept: PHYSICAL THERAPY | Age: 42
End: 2022-09-23

## 2022-09-23 DIAGNOSIS — R00.2 PALPITATIONS: Primary | ICD-10-CM

## 2022-09-23 PROCEDURE — 97140 MANUAL THERAPY 1/> REGIONS: CPT | Performed by: PHYSICAL THERAPIST

## 2022-09-23 PROCEDURE — 97014 ELECTRIC STIMULATION THERAPY: CPT | Performed by: PHYSICAL THERAPIST

## 2022-09-23 PROCEDURE — 97110 THERAPEUTIC EXERCISES: CPT | Performed by: PHYSICAL THERAPIST

## 2022-09-23 NOTE — PROGRESS NOTES
PT DAILY TREATMENT NOTE - [facility] 2-15    Patient Name: Amor Mejía  Date:2022  : 1980  [x]  Patient  Verified  Payor: [facility] / Plan: [employer] / Product Type: Samanta /    In time:10:00 a  Out time: 11:00 a  Total Treatment Time (min): 60  Total Timed Codes (min): 45  1:1 Treatment Time ( [address] only): -   Visit #: 11      Treatment Area: Neck pain [M54.2]    SUBJECTIVE  Pain Level (0-10 scale): 4  Any medication changes, allergies to medications, adverse drug reactions, diagnosis change, or new procedure performed?: [x] No    [] Yes (see summary sheet for update)  Subjective functional status/changes:   [] No changes reported  Patient reports continued improvement with both her neck and L TMJ, but continues to have difficulty with chewing and turning her head.  She also has had moments where pain radiates down the L UE.    OBJECTIVE    Modality rationale: decrease pain and increase tissue extensibility to improve the patients ability to bend forwards and chew food without pain   Min Type Additional Details   15 [] Estim: []Att   []Unatt        []TENS instruct                  []IFC  []Premod   []NMES                     []Other:  []w/US   []w/ice   []w/heat  Position:  Location:    []  Traction: [] Cervical       []Lumbar                       [] Prone          []Supine                       []Intermittent   []Continuous Lbs:  [] before manual  [] after manual  []w/heat    []  Ultrasound: []Continuous   [] Pulsed at:                           []1MHz   []3MHz Location:  W/cm2:    [] Paraffin         Location:   []w/heat    []  Ice     [x]  Heat  []  Ice massage Position: supine  Location: cervical    []  Laser  []  Other: Position:  Location:      []  Vasopneumatic Device Pressure:       [] lo [] med [] hi   Temperature:      [x] Skin assessment post-treatment:  [x]intact []redness- no adverse reaction    []redness - adverse reaction:     30 min Therapeutic Exercise: [x] See flow sheet :   Rationale: increase ROM to improve patients ability to bend forward ,look over shoulder and chew without pain    15 min Manual Therapy:  MFR UT, cervical paraspinals, cervical A/P mobs, SOR with manual traction, UT stretch   Rationale: decrease pain, increase ROM, increase tissue extensibility, and decrease trigger points to improve the patients ability to bend forward ,look over shoulder and chew without pain          With   [x] TE   [] TA   [] neuro   [] other: Patient Education: [x] Review HEP    [] Progressed/Changed HEP based on:   [] positioning   [] body mechanics   [] transfers   [] heat/ice application    [] other:      Other Objective/Functional Measures:   Very TTP along the left cervical paraspinals, especially C4-C5. MFR was better tolerated with the head in R SB versus neutral    Pain Level (0-10 scale) post treatment: 2    ASSESSMENT/Changes in Function:     Patient will continue to benefit from skilled PT services to modify and progress therapeutic interventions, address functional mobility deficits, address ROM deficits, address strength deficits, analyze and address soft tissue restrictions, analyze and cue movement patterns, analyze and modify body mechanics/ergonomics, and assess and modify postural abnormalities to attain remaining goals. []  See Plan of Care  []  See progress note/recertification  []  See Discharge Summary         Progress towards goals / Updated goals:  No significant progress made on today's visit. Will continue at 2x/week for the next month to see if the pain can be brought down to more manageable levels. PLAN  [x]  Upgrade activities as tolerated     [x]  Continue plan of care  [x]  Update interventions per flow sheet       []  Discharge due to:_  []  Other:_      Shelley Jacinto, PT , DPT, OCS, Cert.  DN   9/23/2022

## 2022-09-28 ENCOUNTER — APPOINTMENT (OUTPATIENT)
Dept: PHYSICAL THERAPY | Age: 42
End: 2022-09-28

## 2022-09-30 ENCOUNTER — APPOINTMENT (OUTPATIENT)
Dept: PHYSICAL THERAPY | Age: 42
End: 2022-09-30

## 2022-09-30 ENCOUNTER — HOSPITAL ENCOUNTER (OUTPATIENT)
Dept: PHYSICAL THERAPY | Age: 42
Discharge: HOME | End: 2022-09-30

## 2022-09-30 PROCEDURE — 97014 ELECTRIC STIMULATION THERAPY: CPT | Performed by: PHYSICAL THERAPIST

## 2022-09-30 PROCEDURE — 97140 MANUAL THERAPY 1/> REGIONS: CPT | Performed by: PHYSICAL THERAPIST

## 2022-09-30 PROCEDURE — 97110 THERAPEUTIC EXERCISES: CPT | Performed by: PHYSICAL THERAPIST

## 2022-09-30 NOTE — PROGRESS NOTES
PT DAILY TREATMENT NOTE - [facility] 2-15    Patient Name: Raul Thorne  Date:2022  : 1980  [x]  Patient  Verified  Payor: [facility] / Plan: [employer] / Product Type: Samanta /    In time:9:15 a  Out time: 10:15 a  Total Treatment Time (min): 60  Total Timed Codes (min): 45  1:1 Treatment Time ( [address] only): -   Visit #: 12      Treatment Area: Neck pain [M54.2]    SUBJECTIVE  Pain Level (0-10 scale): 3  Any medication changes, allergies to medications, adverse drug reactions, diagnosis change, or new procedure performed?: [x] No    [] Yes (see summary sheet for update)  Subjective functional status/changes:   [] No changes reported  Patient reports a slight improvement in her L neck pain since last week.     OBJECTIVE    Modality rationale: decrease pain and increase tissue extensibility to improve the patients ability to bend forwards and chew food without pain   Min Type Additional Details   15 [] Estim: []Att   []Unatt        []TENS instruct                  []IFC  []Premod   []NMES                     []Other:  []w/US   []w/ice   []w/heat  Position:  Location:    []  Traction: [] Cervical       []Lumbar                       [] Prone          []Supine                       []Intermittent   []Continuous Lbs:  [] before manual  [] after manual  []w/heat    []  Ultrasound: []Continuous   [] Pulsed at:                           []1MHz   []3MHz Location:  W/cm2:    [] Paraffin         Location:   []w/heat    []  Ice     [x]  Heat  []  Ice massage Position: supine  Location: cervical    []  Laser  []  Other: Position:  Location:      []  Vasopneumatic Device Pressure:       [] lo [] med [] hi   Temperature:      [x] Skin assessment post-treatment:  [x]intact []redness- no adverse reaction    []redness - adverse reaction:     30 min Therapeutic Exercise:  [x] See flow sheet :   Rationale: increase ROM to improve patients ability to bend forward ,look over shoulder and chew without pain    15 min Manual Therapy:  MFR UT, cervical paraspinals, cervical A/P mobs, SOR with manual traction, UT stretch   Rationale: decrease pain, increase ROM, increase tissue extensibility, and decrease trigger points to improve the patients ability to bend forward ,look over shoulder and chew without pain          With   [x] TE   [] TA   [] neuro   [] other: Patient Education: [x] Review HEP    [] Progressed/Changed HEP based on:   [] positioning   [] body mechanics   [] transfers   [] heat/ice application    [] other:      Other Objective/Functional Measures:   Continued TTP along L cervical paraspinals, but improved ~50% compared to previous vist    Pain Level (0-10 scale) post treatment: 2    ASSESSMENT/Changes in Function:     Patient will continue to benefit from skilled PT services to modify and progress therapeutic interventions, address functional mobility deficits, address ROM deficits, address strength deficits, analyze and address soft tissue restrictions, analyze and cue movement patterns, analyze and modify body mechanics/ergonomics, and assess and modify postural abnormalities to attain remaining goals. []  See Plan of Care  []  See progress note/recertification  []  See Discharge Summary         Progress towards goals / Updated goals:  Slow, but steady progress towards long term functional goals. PLAN  [x]  Upgrade activities as tolerated     [x]  Continue plan of care  [x]  Update interventions per flow sheet       []  Discharge due to:_  []  Other:_      Elias Dangelo, PT , DPT, OCS, Cert.  DN   9/30/2022

## 2022-10-03 ENCOUNTER — HOSPITAL ENCOUNTER (OUTPATIENT)
Dept: NON INVASIVE DIAGNOSTICS | Age: 42
Discharge: HOME | End: 2022-10-03
Attending: INTERNAL MEDICINE
Payer: SUBSIDIZED

## 2022-10-03 DIAGNOSIS — R00.2 PALPITATIONS: ICD-10-CM

## 2022-10-03 PROCEDURE — 93225 XTRNL ECG REC<48 HRS REC: CPT

## 2022-10-04 ENCOUNTER — APPOINTMENT (OUTPATIENT)
Dept: PHYSICAL THERAPY | Age: 42
End: 2022-10-04

## 2022-10-06 PROCEDURE — 93227 XTRNL ECG REC<48 HR R&I: CPT | Performed by: INTERNAL MEDICINE

## 2022-10-12 ENCOUNTER — HOSPITAL ENCOUNTER (OUTPATIENT)
Dept: PHYSICAL THERAPY | Age: 42
Discharge: HOME | End: 2022-10-12
Payer: SUBSIDIZED

## 2022-10-12 PROCEDURE — 97014 ELECTRIC STIMULATION THERAPY: CPT

## 2022-10-12 PROCEDURE — 97110 THERAPEUTIC EXERCISES: CPT

## 2022-10-12 PROCEDURE — 97140 MANUAL THERAPY 1/> REGIONS: CPT

## 2022-10-12 NOTE — PROGRESS NOTES
PT DAILY TREATMENT NOTE - [facility] 2-15    Patient Name: Mariaa Sparks  Date:10/12/2022  : 1980  [x]  Patient  Verified  Payor: [address] [place] [address] Avenue / Plan: [facility] / Product Type: Samanta /    In time:12:15p  Out time: 1:15p  Total Treatment Time (min): 60  Total Timed Codes (min): 45  1:1 Treatment Time ( [address] only): -   Visit #: 13      Treatment Area: Neck pain [M54.2]    SUBJECTIVE  Pain Level (0-10 scale): 4-5  Any medication changes, allergies to medications, adverse drug reactions, diagnosis change, or new procedure performed?: [x] No    [] Yes (see summary sheet for update)  Subjective functional status/changes:   [] No changes reported  Patient reports she woke up and was not able to more her neck or upper back a few days ago due to pain. Patient reports she has been having more trouble griping and lifting her arm since then.     OBJECTIVE    Modality rationale: decrease pain and increase tissue extensibility to improve the patients ability to bend forwards and chew food without pain   Min Type Additional Details   15 [x] Estim: []Att   []Unatt        []TENS instruct                  [x]IFC  []Premod   []NMES                     []Other:  []w/US   []w/ice   [x]w/heat  Position: supine  Location: neck    []  Traction: [] Cervical       []Lumbar                       [] Prone          []Supine                       []Intermittent   []Continuous Lbs:  [] before manual  [] after manual  []w/heat    []  Ultrasound: []Continuous   [] Pulsed at:                           []1MHz   []3MHz Location:  W/cm2:    [] Paraffin         Location:   []w/heat    []  Ice     [x]  Heat  []  Ice massage Position: supine  Location: cervical    []  Laser  []  Other: Position:  Location:      []  Vasopneumatic Device Pressure:       [] lo [] med [] hi   Temperature:      [x] Skin assessment post-treatment:  [x]intact []redness- no adverse reaction    []redness - adverse reaction:     30 min Therapeutic Exercise:  [x] See flow sheet :   Rationale: increase ROM to improve patients ability to bend forward ,look over shoulder and chew without pain    15 min Manual Therapy:  MFR UT, cervical paraspinals, cervical A/P mobs, SOR with manual traction, UT stretch   Rationale: decrease pain, increase ROM, increase tissue extensibility, and decrease trigger points to improve the patients ability to bend forward ,look over shoulder and chew without pain          With   [x] TE   [] TA   [] neuro   [] other: Patient Education: [x] Review HEP    [] Progressed/Changed HEP based on:   [] positioning   [] body mechanics   [] transfers   [] heat/ice application    [] other:      Other Objective/Functional Measures:   Continued TTP along L cervical paraspinals    Pain Level (0-10 scale) post treatment: 2    ASSESSMENT/Changes in Function:     Patient will continue to benefit from skilled PT services to modify and progress therapeutic interventions, address functional mobility deficits, address ROM deficits, address strength deficits, analyze and address soft tissue restrictions, analyze and cue movement patterns, analyze and modify body mechanics/ergonomics, and assess and modify postural abnormalities to attain remaining goals. []  See Plan of Care  []  See progress note/recertification  []  See Discharge Summary         Progress towards goals / Updated goals:  Patient with recent increased pain and tenderness along left upper trap and medial scapular border.     PLAN  [x]  Upgrade activities as tolerated     [x]  Continue plan of care  [x]  Update interventions per flow sheet       []  Discharge due to:_  []  Other:_      Ava Akers , PTA  10/12/2022

## 2022-10-20 ENCOUNTER — HOSPITAL ENCOUNTER (OUTPATIENT)
Dept: PHYSICAL THERAPY | Age: 42
Discharge: HOME | End: 2022-10-20

## 2022-10-20 PROCEDURE — 97110 THERAPEUTIC EXERCISES: CPT

## 2022-10-20 PROCEDURE — 97140 MANUAL THERAPY 1/> REGIONS: CPT

## 2022-10-20 PROCEDURE — 97014 ELECTRIC STIMULATION THERAPY: CPT

## 2022-10-20 NOTE — PROGRESS NOTES
PT DAILY TREATMENT NOTE - [facility] 2-15    Patient Name: Jong Gonzalez  Date:10/20/2022  : 1980  [x]  Patient  Verified  Payor: [address] / Plan: [facility] / Product Type: Samanta /    In time:1:00p  Out time: 2:00p  Total Treatment Time (min): 60  Total Timed Codes (min): 45  1:1 Treatment Time ([facility] only): -   Visit #: 14      Treatment Area: Neck pain [M54.2]    SUBJECTIVE  Pain Level (0-10 scale): 3  Any medication changes, allergies to medications, adverse drug reactions, diagnosis change, or new procedure performed?: [x] No    [] Yes (see summary sheet for update)  Subjective functional status/changes:   [] No changes reported  Patient reports she is still have some pain, but has been feeling better compared to last visit. Patient states she is seeing a specialist on 10/26.     OBJECTIVE    Modality rationale: decrease pain and increase tissue extensibility to improve the patients ability to bend forwards and chew food without pain   Min Type Additional Details   15 [x] Estim: []Att   []Unatt        []TENS instruct                  [x]IFC  []Premod   []NMES                     []Other:  []w/US   []w/ice   [x]w/heat  Position: supine  Location: neck    []  Traction: [] Cervical       []Lumbar                       [] Prone          []Supine                       []Intermittent   []Continuous Lbs:  [] before manual  [] after manual  []w/heat    []  Ultrasound: []Continuous   [] Pulsed at:                           []1MHz   []3MHz Location:  W/cm2:    [] Paraffin         Location:   []w/heat    []  Ice     [x]  Heat  []  Ice massage Position: supine  Location: cervical    []  Laser  []  Other: Position:  Location:      []  Vasopneumatic Device Pressure:       [] lo [] med [] hi   Temperature:      [x] Skin assessment post-treatment:  [x]intact []redness- no adverse reaction    []redness - adverse reaction:     25 min Therapeutic Exercise:  [x] See flow sheet :   Rationale: increase ROM to improve patients ability to bend forward ,look over shoulder and chew without pain    20 min Manual Therapy:  MFR UT, cervical paraspinals, cervical A/P mobs, SOR with manual traction, UT stretch   Rationale: decrease pain, increase ROM, increase tissue extensibility, and decrease trigger points to improve the patients ability to bend forward ,look over shoulder and chew without pain          With   [x] TE   [] TA   [] neuro   [] other: Patient Education: [x] Review HEP    [] Progressed/Changed HEP based on:   [] positioning   [] body mechanics   [] transfers   [] heat/ice application    [] other:      Other Objective/Functional Measures:   Increased pain and numbness after 2 minutes on UBE today, discontinued for this visit    Pain Level (0-10 scale) post treatment: 2    ASSESSMENT/Changes in Function:     Patient will continue to benefit from skilled PT services to modify and progress therapeutic interventions, address functional mobility deficits, address ROM deficits, address strength deficits, analyze and address soft tissue restrictions, analyze and cue movement patterns, analyze and modify body mechanics/ergonomics, and assess and modify postural abnormalities to attain remaining goals. []  See Plan of Care  []  See progress note/recertification  []  See Discharge Summary         Progress towards goals / Updated goals:  Patient with recent increased pain and tenderness along left upper trap and medial scapular border.     PLAN  [x]  Upgrade activities as tolerated     [x]  Continue plan of care  [x]  Update interventions per flow sheet       []  Discharge due to:_  []  Other:_      Janna Reeves , PTA  10/20/2022

## 2022-10-26 ENCOUNTER — APPOINTMENT (OUTPATIENT)
Dept: PHYSICAL THERAPY | Age: 42
End: 2022-10-26

## 2022-10-28 ENCOUNTER — HOSPITAL ENCOUNTER (OUTPATIENT)
Dept: PHYSICAL THERAPY | Age: 42
Discharge: HOME | End: 2022-10-28

## 2022-10-28 ENCOUNTER — APPOINTMENT (OUTPATIENT)
Dept: PHYSICAL THERAPY | Age: 42
End: 2022-10-28

## 2022-10-28 PROCEDURE — 97110 THERAPEUTIC EXERCISES: CPT

## 2022-10-28 PROCEDURE — 97014 ELECTRIC STIMULATION THERAPY: CPT

## 2022-10-28 PROCEDURE — 97140 MANUAL THERAPY 1/> REGIONS: CPT

## 2022-10-28 NOTE — PROGRESS NOTES
PT DAILY TREATMENT NOTE - [facility] 2-15    Patient Name: Geetha Figueroa  Date:10/28/2022  : 1980  [x]  Patient  Verified  Payor: [address] / Plan: [facility] / Product Type: Samanta /    In time:11:30a  Out time: 12:30p  Total Treatment Time (min): 60  Total Timed Codes (min): 45  1:1 Treatment Time ( [address] only): -   Visit #: 15      Treatment Area: Neck pain [M54.2]    SUBJECTIVE  Pain Level (0-10 scale): 3  Any medication changes, allergies to medications, adverse drug reactions, diagnosis change, or new procedure performed?: [x] No    [] Yes (see summary sheet for update)  Subjective functional status/changes:   [] No changes reported  Patient reports she is getting imaging done either CT or MRI, not sure which.     OBJECTIVE    Modality rationale: decrease pain and increase tissue extensibility to improve the patients ability to bend forwards and chew food without pain   Min Type Additional Details   15 [x] Estim: []Att   []Unatt        []TENS instruct                  [x]IFC  []Premod   []NMES                     []Other:  []w/US   []w/ice   [x]w/heat  Position: supine  Location: neck    []  Traction: [] Cervical       []Lumbar                       [] Prone          []Supine                       []Intermittent   []Continuous Lbs:  [] before manual  [] after manual  []w/heat    []  Ultrasound: []Continuous   [] Pulsed at:                           []1MHz   []3MHz Location:  W/cm2:    [] Paraffin         Location:   []w/heat    []  Ice     [x]  Heat  []  Ice massage Position: supine  Location: cervical    []  Laser  []  Other: Position:  Location:      []  Vasopneumatic Device Pressure:       [] lo [] med [] hi   Temperature:      [x] Skin assessment post-treatment:  [x]intact []redness- no adverse reaction    []redness - adverse reaction:     25 min Therapeutic Exercise:  [x] See flow sheet :   Rationale: increase ROM to improve patients ability to bend forward ,look over shoulder and chew without pain    20 min Manual Therapy:  MFR UT, cervical paraspinals, cervical A/P mobs, SOR with manual traction, UT stretch   Rationale: decrease pain, increase ROM, increase tissue extensibility, and decrease trigger points to improve the patients ability to bend forward ,look over shoulder and chew without pain          With   [x] TE   [] TA   [] neuro   [] other: Patient Education: [x] Review HEP    [] Progressed/Changed HEP based on:   [] positioning   [] body mechanics   [] transfers   [] heat/ice application    [] other:      Other Objective/Functional Measures:   No increased pain with interventions today, decreased TTP noted with manual    Pain Level (0-10 scale) post treatment: 1    ASSESSMENT/Changes in Function:     Patient will continue to benefit from skilled PT services to modify and progress therapeutic interventions, address functional mobility deficits, address ROM deficits, address strength deficits, analyze and address soft tissue restrictions, analyze and cue movement patterns, analyze and modify body mechanics/ergonomics, and assess and modify postural abnormalities to attain remaining goals. []  See Plan of Care  []  See progress note/recertification  []  See Discharge Summary         Progress towards goals / Updated goals:  Patient making slow progress towards goals with continued varying pain between visits. Patient has made great progress the last few weeks. Patient to schedule imaging and will hold therapy for further recommendations.     PLAN  [x]  Upgrade activities as tolerated     [x]  Continue plan of care  [x]  Update interventions per flow sheet       []  Discharge due to:_  []  Other:_      Lalitha Arms , PTA  10/28/2022

## 2022-11-17 ENCOUNTER — TRANSCRIBE ORDER (OUTPATIENT)
Dept: SCHEDULING | Age: 42
End: 2022-11-17

## 2022-11-17 DIAGNOSIS — G89.4 CHRONIC PAIN SYNDROME: Primary | ICD-10-CM

## 2022-11-22 ENCOUNTER — HOSPITAL ENCOUNTER (OUTPATIENT)
Dept: PHYSICAL THERAPY | Age: 42
Discharge: HOME | End: 2022-11-22

## 2022-11-22 PROCEDURE — 97140 MANUAL THERAPY 1/> REGIONS: CPT | Performed by: PHYSICAL THERAPIST

## 2022-11-22 PROCEDURE — 97110 THERAPEUTIC EXERCISES: CPT | Performed by: PHYSICAL THERAPIST

## 2022-11-22 PROCEDURE — 97012 MECHANICAL TRACTION THERAPY: CPT | Performed by: PHYSICAL THERAPIST

## 2022-11-22 NOTE — PROGRESS NOTES
PT DAILY TREATMENT NOTE - [facility] 2-15    Patient Name: Hillary Dacosta  Date:2022  : 1980  [x]  Patient  Verified  Payor: /    In time:2:30p  Out time: 3:30p  Total Treatment Time (min): 60  Total Timed Codes (min): 45  1:1 Treatment Time ( [address] only): -   Visit #: 16      Treatment Area: Neck pain [M54.2]    SUBJECTIVE  Pain Level (0-10 scale): 5  Any medication changes, allergies to medications, adverse drug reactions, diagnosis change, or new procedure performed?: [x] No    [] Yes (see summary sheet for update)  Subjective functional status/changes:   [] No changes reported  Patient reports that her neck pain has been bothering her more this week compared to last week.     OBJECTIVE    Modality rationale: decrease pain and increase tissue extensibility to improve the patients ability to bend forwards and chew food without pain   Min Type Additional Details    [] Estim: []Att   []Unatt        []TENS instruct                  []IFC  []Premod   []NMES                     []Other:  []w/US   []w/ice   [x]w/heat  Position: supine  Location: neck   15 [x]  Traction: [x] Cervical       []Lumbar                       [] Prone          [x]Supine                       Interm[]ittent   [x]Continuous Lbs:15  [] before manual  [x] after manual  [x]w/heat    []  Ultrasound: []Continuous   [] Pulsed at:                           []1MHz   []3MHz Location:  W/cm2:    [] Paraffin         Location:   []w/heat    []  Ice     [x]  Heat  []  Ice massage Position: supine  Location: cervical    []  Laser  []  Other: Position:  Location:      []  Vasopneumatic Device Pressure:       [] lo [] med [] hi   Temperature:      [x] Skin assessment post-treatment:  [x]intact []redness- no adverse reaction    []redness - adverse reaction:     25 min Therapeutic Exercise:  [x] See flow sheet :   Rationale: increase ROM to improve patients ability to bend forward ,look over shoulder and chew without pain    20 min Manual Therapy: MFR UT, cervical paraspinals, cervical A/P mobs, SOR with manual traction, UT stretch   Rationale: decrease pain, increase ROM, increase tissue extensibility, and decrease trigger points to improve the patients ability to bend forward ,look over shoulder and chew without pain          With   [x] TE   [] TA   [] neuro   [] other: Patient Education: [x] Review HEP    [] Progressed/Changed HEP based on:   [] positioning   [] body mechanics   [] transfers   [] heat/ice application    [] other:      Other Objective/Functional Measures:   Poor tolerance to STM on today's visit, however symptoms resolved with [facility] traction    Pain Level (0-10 scale) post treatment: 1    ASSESSMENT/Changes in Function:     Patient will continue to benefit from skilled PT services to modify and progress therapeutic interventions, address functional mobility deficits, address ROM deficits, address strength deficits, analyze and address soft tissue restrictions, analyze and cue movement patterns, analyze and modify body mechanics/ergonomics, and assess and modify postural abnormalities to attain remaining goals. []  See Plan of Care  []  See progress note/recertification  []  See Discharge Summary         Progress towards goals / Updated goals:  No significant progress towards long term goals on today's date. Amalia Roberts Tx was tolerated well and will continue to utilize as needed for future visits. PLAN  [x]  Upgrade activities as tolerated     [x]  Continue plan of care  [x]  Update interventions per flow sheet       []  Discharge due to:_  []  Other:_      Gabriela John, PT , DPT, OCS, Cert.  DN   11/22/2022

## 2022-12-17 ENCOUNTER — HOSPITAL ENCOUNTER (OUTPATIENT)
Dept: MRI IMAGING | Age: 42
End: 2022-12-17
Payer: SUBSIDIZED

## 2022-12-17 DIAGNOSIS — G89.4 CHRONIC PAIN SYNDROME: ICD-10-CM

## 2022-12-17 PROCEDURE — 72148 MRI LUMBAR SPINE W/O DYE: CPT

## 2023-03-23 ENCOUNTER — HOSPITAL ENCOUNTER (OUTPATIENT)
Dept: PHYSICAL THERAPY | Age: 43
Discharge: HOME | End: 2023-03-23

## 2023-03-23 PROCEDURE — 97162 PT EVAL MOD COMPLEX 30 MIN: CPT | Performed by: PHYSICAL THERAPIST

## 2023-03-23 PROCEDURE — 97110 THERAPEUTIC EXERCISES: CPT | Performed by: PHYSICAL THERAPIST

## 2023-03-23 PROCEDURE — 97140 MANUAL THERAPY 1/> REGIONS: CPT | Performed by: PHYSICAL THERAPIST

## 2023-03-23 PROCEDURE — 97014 ELECTRIC STIMULATION THERAPY: CPT | Performed by: PHYSICAL THERAPIST

## 2023-03-23 NOTE — PROGRESS NOTES
PT INITIAL EVALUATION NOTE 2-15    Patient Name: Carol Gomez  Date:3/23/2023  : 1980  [x]  Patient  Verified  Payor: /    In time:10:15 AM  Out time:11:15 AM  Total Treatment Time (min): 60  Visit #: 1     Treatment Area: Other low back pain [M54.59]  Neck pain [M54.2]    SUBJECTIVE  Pain Level (0-10 scale): 10/10  Any medication changes, allergies to medications, adverse drug reactions, diagnosis change, or new procedure performed?: [] No    [x] Yes (see summary sheet for update)  Subjective:     L TMJ, neck  PLOF: No limitations with bending forward, turning head, chewing food  Mechanism of Injury: 3 years ago the patient had a tooth (L molar) removed in [place]. It developed an infection which led to swelling and weakness through the L side of her face. She also had to have her mouth held open by an apparatus for several weeks. This resulted in L TMD that limited her ability to eat. Several months later she developed L UT pain with radiating L UE pain. Previous Treatment/Compliance: She was seen at this location throughout  to address both problems. While the L TMJ responded well, she continued to have pain at the L UT. She was unable to continue PT for several months and is now returning with similar neck symptoms and greater L TMJ pain. PMHx/Surgical Hx: Lumbar radiculopathy that has felt better following an TAMARA late last year.   Work Hx: n/a  Living Situation: lives in a two story home with family  Pt Goals: to reduce pain and improve mobility  Barriers: chronicity  Motivation: motivated  Substance use: none   Cognition: A & O x 4         OBJECTIVE/EXAMINATION  TMJ Evaluation     History  Chief Complaint: chewing, yawning  Pain Location: L TMJ              Constant vs intermittent: constant              Pain increases with: opening              Pain decreases with: rest  Onset (insidious vs trauma): insidious  Difficulty opening mouth?: yes  Clicking or popping?: yes  Jaw locking?: no  Pain with chewing, yawning, wide opening?: yes  Pain in ears/cheeks or ringing in ears?: no  Any injury to jaw, head or neck?: chronic neck pain  On hard or soft diet?: soft  Arthritis?: no  Previous history of TMJ problems?: Patient had her L molar removed in 2019. She suffered from an infection afterwards that took several months to resolve. Objective  TMJ resting position (toungue behind teeth with slightly apart): no  Mouth vs Nose Breathing: mouth  Posture: forward head  C-spine ROM:   Flexion: 30  Extension: 20, \"If I go further I get dizzy. \"  Rotation: R: 30 L: 45  SB: R: 30         L 30  C-spine strentgh: WNL  U/E ROM: WNL  U/E Strength:  WNL  Mandible ROM              (R) lateral deviation: 10              (L) lateral deviation: 0              Opening (4-5cm or 2 of pt's fingers=normal): 30  Resisted mandible motions: NT  Loading of TMJ (push posterior): Very painful  Dental Roll Bite              (R) roll: NT              (L) roll: NT  Palpation:              Posterior capsule (in ear, mouth open): TTP              Lateral capsule (external): very TTP              Masseter: very TTP              Pterygoids: NT              SCM: TTP              Scalenes: TTP               Suboccipital: very TTP              Trapezius: very TTP               Modality rationale: decrease pain and increase tissue extensibility to improve the patients ability to bend forward and chew food without pain    Min Type Additional Details    15 [x] Estim: []Att   []Unatt        []TENS instruct                  [x]IFC  []Premod   []NMES                     []Other:  []w/US   [x]w/ice   []w/heat  Position:supine  Location:L UT, ice as well to L TMJ     []  Traction: [] Cervical       []Lumbar                       [] Prone          []Supine                       []Intermittent   []Continuous Lbs:  [] before manual  [] after manual  []w/heat     []  Ultrasound: []Continuous   [] Pulsed at: []1MHz   []3MHz Location:  W/cm2:     []  Paraffin         Location:  []w/heat    []  Ice     []  Heat  []  Ice massage Position: supine  Location: low back, neck, TMJ     []  Laser  []  Other: Position:  Location:     []  Vasopneumatic Device Pressure:       [] lo [] med [] hi   Temperature:    [x] Skin assessment post-treatment:  [x]intact []redness- no adverse reaction    []redness - adverse reaction:      10 min Therapeutic Exercise:  [x] See flow sheet :   Rationale: increase ROM, increase strength, and improve coordination to improve the patients ability to bend forward and chew without pain     15 min Manual Therapy:    Infraclavicular pumping  B Sibson's  Gentle MFR to L temporalis and masseter with R & L cervical rotation   Rationale: decrease pain, increase ROM, and increase tissue extensibility  to improve the patients ability to bend forward and chew food without pain             With   [] TE   [] TA   [] Neuro   [] SC   [] other: Patient Education: [x] Review HEP    [] Progressed/Changed HEP based on:   [] positioning   [] body mechanics   [] transfers   [] heat/ice application    [] other:        Other Objective/Functional Measures:   Pain Level (0-10 scale) post treatment: 5/10      ASSESSMENT:      [x]  See Plan of Chalo aMrtin, PT 3/23/2023

## 2023-03-23 NOTE — PROGRESS NOTES
Physical Therapy at ,   a part of  [facility]  [address] [facility]  Phone: [phone]  Fax: [phone]    Plan of Care/ Statement of Necessity for Physical Therapy Services 2-15    Patient name: Sary Wren  : 1980  Provider#: [id number]  Referral source: Leobardo Nunez NP      Medical/Treatment Diagnosis: Other low back pain [M54.59]  Neck pain [M54.2]     Prior Hospitalization: see medical history     Comorbidities: Lumbar radiculopathy  Prior Level of Function: see initial eval   Medications: Verified on Patient Summary List    Start of Care: 3/23/2023      Onset Date:        The Plan of Care and following information is based on the information from the initial evaluation. Assessment/ key information: Patient presents with chronic L TMJ, neck, and low back pain with significant loss of ROM at all sites. Patient is now hypersensitive to light touch at the L masseter and L suboccipital musculature, but tolerates manual therapy well. Evaluation Complexity History MEDIUM  Complexity : 1-2 comorbidities / personal factors will impact the outcome/ POC ; Examination MEDIUM Complexity : 3 Standardized tests and measures addressing body structure, function, activity limitation and / or participation in recreation  ;Presentation MEDIUM Complexity : Evolving with changing characteristics  ; Clinical Decision Making MEDIUM Complexity : FOTO score of 26-74  Overall Complexity Rating: MEDIUM     Problem List: pain affecting function, decrease ROM, decrease strength, decrease ADL/ functional abilitiies, decrease activity tolerance, and decrease flexibility/ joint mobility          Treatment Plan may include any combination of the following: Therapeutic exercise, Therapeutic activities, Neuromuscular re-education, Physical agent/modality, Gait/balance training, Manual therapy, Patient education, Self Care training, Functional mobility training, Home safety training, and Other: dry needling  Patient / Family readiness to learn indicated by: asking questions, trying to perform skills, and interest  Persons(s) to be included in education: patient (P)  Barriers to Learning/Limitations: None  Patient Goal (s): I want to be able to chew without pain.   Patient Self Reported Health Status: good  Rehabilitation Potential: good     Short Term Goals: To be accomplished in 4 weeks:  Patient will be able to demonstrate at least 30 mm of mandibular opening without an audible click. Patient will be able to demonstrate >45 degrees of cervical AROM rotation in either direction with <5/10 neck pain. Patient will be able to demonstrate at least 30 degrees of cervical extension AROM with <5/10 neck pain  Long Term Goals: To be accomplished in 12 weeks:  Patient will be able to demonstrate at 40 mm of mandibular opening without pain. Patient will be able to demonstrate at least 20 mm of mandibular lateral deviation in both direction with <3/10 pain. Patient will be able to demonstrate >60 degrees of AROM cervical rotation to either direction with <3/10 pain. Frequency / Duration: Patient to be seen 2 times per week for 12 weeks. Patient/ Caregiver education and instruction: self care, activity modification, and exercises      [x]  Plan of care has been reviewed with TATI Bhatia, PT 3/23/2023     ________________________________________________________________________    I certify that the above Therapy Services are being furnished while the patient is under my care. I agree with the treatment plan and certify that this therapy is necessary.     [de-identified] Signature:____________________  Date:____________Time: _________      Roberto Romo NP

## 2023-03-28 ENCOUNTER — APPOINTMENT (OUTPATIENT)
Dept: PHYSICAL THERAPY | Age: 43
End: 2023-03-28

## 2023-04-13 ENCOUNTER — HOSPITAL ENCOUNTER (OUTPATIENT)
Dept: PHYSICAL THERAPY | Age: 43
Discharge: HOME | End: 2023-04-13

## 2023-04-13 PROCEDURE — 97140 MANUAL THERAPY 1/> REGIONS: CPT | Performed by: PHYSICAL MEDICINE & REHABILITATION

## 2023-04-13 PROCEDURE — 97110 THERAPEUTIC EXERCISES: CPT | Performed by: PHYSICAL MEDICINE & REHABILITATION

## 2023-04-13 PROCEDURE — 97016 VASOPNEUMATIC DEVICE THERAPY: CPT | Performed by: PHYSICAL MEDICINE & REHABILITATION

## 2023-04-18 ENCOUNTER — APPOINTMENT (OUTPATIENT)
Dept: PHYSICAL THERAPY | Age: 43
End: 2023-04-18

## 2023-04-20 ENCOUNTER — HOSPITAL ENCOUNTER (OUTPATIENT)
Dept: PHYSICAL THERAPY | Age: 43
Discharge: HOME | End: 2023-04-20

## 2023-04-20 PROCEDURE — 97140 MANUAL THERAPY 1/> REGIONS: CPT | Performed by: PHYSICAL THERAPIST

## 2023-04-20 PROCEDURE — 97110 THERAPEUTIC EXERCISES: CPT | Performed by: PHYSICAL THERAPIST

## 2023-04-20 PROCEDURE — 97016 VASOPNEUMATIC DEVICE THERAPY: CPT | Performed by: PHYSICAL THERAPIST

## 2023-04-25 ENCOUNTER — HOSPITAL ENCOUNTER (OUTPATIENT)
Dept: PHYSICAL THERAPY | Age: 43
Discharge: HOME | End: 2023-04-25

## 2023-04-25 PROCEDURE — 97140 MANUAL THERAPY 1/> REGIONS: CPT | Performed by: PHYSICAL MEDICINE & REHABILITATION

## 2023-04-25 PROCEDURE — 97110 THERAPEUTIC EXERCISES: CPT | Performed by: PHYSICAL MEDICINE & REHABILITATION

## 2023-04-25 PROCEDURE — 97016 VASOPNEUMATIC DEVICE THERAPY: CPT | Performed by: PHYSICAL MEDICINE & REHABILITATION

## 2023-04-25 NOTE — PROGRESS NOTES
PT DAILY TREATMENT NOTE - [facility] 2-15    Patient Name: Davon Cunha  Date:2023  : 1980  [x]  Patient  Verified  Payor: /    In time:1130a  Out time:1225p  Total Treatment Time (min): 55  Total Timed Codes (min): 40  1:1 Treatment Time ( [address] only): 40   Visit #: 5      Treatment Area: Other low back pain [M54.59]  Neck pain [M54.2]    SUBJECTIVE  Pain Level (0-10 scale): 7-8/10  Any medication changes, allergies to medications, adverse drug reactions, diagnosis change, or new procedure performed?: [x] No    [] Yes (see summary sheet for update)  Subjective functional status/changes:   []   Patient reported continued pain present.     OBJECTIVE    Modality rationale: decrease inflammation and decrease pain to improve the patients ability to ADLs, driving and lifting tolerance   Min Type Additional Details    [] Estim: []Att   []Unatt        []TENS instruct                  []IFC  []Premod   []NMES                     []Other:  []w/US   []w/ice   []w/heat  Position:  Location:    []  Traction: [] Cervical       []Lumbar                       [] Prone          []Supine                       []Intermittent   []Continuous Lbs:  [] before manual  [] after manual  []w/heat    []  Ultrasound: []Continuous   [] Pulsed at:                           []1MHz   []3MHz Location:  W/cm2:    [] Paraffin         Location:   []w/heat   15 [x]  Ice     []  Heat  []  Ice massage Position: reclined  Location: L TMJ    []  Laser  []  Other: Position:  Location:     15 [x]  Vasopneumatic Device L shoulder Pressure:       [x] lo [] med [] hi   Temperature: 34     [x] Skin assessment post-treatment:  [x]intact []redness- no adverse reaction    []redness - adverse reaction:     30 min Therapeutic Exercise:  [x] See flow sheet :   Rationale: increase ROM, increase strength, and improve coordination to improve the patients ability to ADLs, lift and carry    10 min Manual Therapy:  Gentle STM to L UT/Lev and Cervical paraspinals, Gentle P/A mobs to cervical spine, PROM shoulder in all direction to elizabeth. Rationale: decrease pain, increase ROM, increase tissue extensibility, and decrease trigger points to improve the patients ability to ADLs, lift and carry          With   [x] TE   [] TA   [] neuro   [] other: Patient Education: [x] Review HEP    [] Progressed/Changed HEP based on:   [] positioning   [] body mechanics   [] transfers   [] heat/ice application    [] other:      Other Objective/Functional Measures:   Significant improvement in cervical AROM present today    Significant pain present with light skin touch    Pain Level (0-10 scale) post treatment: 5/10    ASSESSMENT/Changes in Function:     Patient will continue to benefit from skilled PT services to modify and progress therapeutic interventions, address functional mobility deficits, address ROM deficits, address strength deficits, analyze and address soft tissue restrictions, analyze and cue movement patterns, analyze and modify body mechanics/ergonomics, and assess and modify postural abnormalities to attain remaining goals. []  See Plan of Care  []  See progress note/recertification  []  See Discharge Summary         Progress towards goals / Updated goals: Will have patient follow up with MD about significant nerve pain. Will continue to progress slowly as tolerated.     PLAN  [x]  Upgrade activities as tolerated     [x]  Continue plan of care  [x]  Update interventions per flow sheet       []  Discharge due to:_  []  Other:_      Karen Jha PTA, OPTA, CPT  4/25/2023

## 2023-04-27 ENCOUNTER — HOSPITAL ENCOUNTER (OUTPATIENT)
Dept: PHYSICAL THERAPY | Age: 43
Discharge: HOME | End: 2023-04-27

## 2023-04-27 PROCEDURE — 97140 MANUAL THERAPY 1/> REGIONS: CPT | Performed by: PHYSICAL MEDICINE & REHABILITATION

## 2023-04-27 PROCEDURE — 97110 THERAPEUTIC EXERCISES: CPT | Performed by: PHYSICAL MEDICINE & REHABILITATION

## 2023-04-27 PROCEDURE — 97016 VASOPNEUMATIC DEVICE THERAPY: CPT | Performed by: PHYSICAL MEDICINE & REHABILITATION

## 2023-04-28 ENCOUNTER — HOSPITAL ENCOUNTER (OUTPATIENT)
Dept: LAB | Age: 43
Discharge: HOME | End: 2023-04-28

## 2023-04-28 PROCEDURE — 36415 COLL VENOUS BLD VENIPUNCTURE: CPT

## 2023-04-28 PROCEDURE — 87338 HPYLORI STOOL AG IA: CPT

## 2023-05-01 ENCOUNTER — APPOINTMENT (OUTPATIENT)
Facility: HOSPITAL | Age: 43
End: 2023-05-01
Payer: SUBSIDIZED

## 2023-05-01 LAB
H PYLORI AG STL QL IA: POSITIVE
SPECIMEN SOURCE: ABNORMAL

## 2023-05-02 ENCOUNTER — HOSPITAL ENCOUNTER (OUTPATIENT)
Dept: PHYSICAL THERAPY | Age: 43
Discharge: HOME | End: 2023-05-02

## 2023-05-02 PROCEDURE — 97140 MANUAL THERAPY 1/> REGIONS: CPT | Performed by: PHYSICAL THERAPIST

## 2023-05-02 PROCEDURE — 97016 VASOPNEUMATIC DEVICE THERAPY: CPT

## 2023-05-02 PROCEDURE — 97110 THERAPEUTIC EXERCISES: CPT | Performed by: PHYSICAL THERAPIST

## 2023-05-02 PROCEDURE — 97016 VASOPNEUMATIC DEVICE THERAPY: CPT | Performed by: PHYSICAL THERAPIST

## 2023-05-02 PROCEDURE — 9990 CHARGE CONVERSION

## 2023-05-02 PROCEDURE — 97140 MANUAL THERAPY 1/> REGIONS: CPT

## 2023-05-02 PROCEDURE — 97110 THERAPEUTIC EXERCISES: CPT

## 2023-06-14 ENCOUNTER — APPOINTMENT (OUTPATIENT)
Facility: HOSPITAL | Age: 43
End: 2023-06-14
Payer: SUBSIDIZED

## 2023-06-21 ENCOUNTER — HOSPITAL ENCOUNTER (OUTPATIENT)
Facility: HOSPITAL | Age: 43
Setting detail: RECURRING SERIES
Discharge: HOME | End: 2023-06-24
Payer: SUBSIDIZED

## 2023-06-21 PROCEDURE — 97140 MANUAL THERAPY 1/> REGIONS: CPT

## 2023-06-21 PROCEDURE — 97110 THERAPEUTIC EXERCISES: CPT

## 2023-06-21 NOTE — PROGRESS NOTES
PHYSICAL THERAPY - DAILY TREATMENT NOTE (updated 3/23)      Date: 2023          Patient Name:  Dora Sanchez :  1980   Medical   Diagnosis:  Neck pain [M54.2] Treatment Diagnosis:  M54.2  NECK PAIN    Referral Source: Bishnu Mayes [place]* Insurance:   Payor: [facility] CARE / Plan: [place]-ACCESS NOW / Product Type: *No Product type* /                     Patient  verified yes     Visit #   Current  / Total 2 12   Time   In / Out 11:55a 1:00p   Total Treatment Time 65 mins   Total Timed Codes 55 mins         SUBJECTIVE    Pain Level (0-10 scale): TMJ 8/10, Neck & Shoulder 5-6/10    Any medication changes, allergies to medications, adverse drug reactions, diagnosis change, or new procedure performed?: [x] No    [] Yes (see summary sheet for update)  Medications: Verified on Patient Summary List    Subjective functional status/changes:     Pt reports continued pain on L side of face, neck, and shoulder. She has been doing exercises at home and reports they are helping along with ice on her L TMJ. OBJECTIVE      Therapeutic Procedures: Tx Min Billable or 1:1 Min (if diff from Tx Min) Procedure, Rationale, Specifics   30  [id number] Therapeutic Exercise (timed):  increase ROM, strength, coordination, balance, and proprioception to improve patient's ability to progress to PLOF and address remaining functional goals. (see flow sheet as applicable)     Details if applicable:  See flowsheet. Unable to tolerate Wall T's and unable to perform chin tucks. Arm did feel better following Radial N glides. 25  [id number] Manual Therapy (timed):  decrease pain, increase ROM, increase tissue extensibility, and decrease trigger points to improve patient's ability to progress to PLOF and address remaining functional goals. The manual therapy interventions were performed at a separate and distinct time from the therapeutic activities interventions .  (see flow sheet as applicable)     Details if applicable:

## 2023-06-21 NOTE — CONSULTS
Session ID: [id number]  Request ID: [id number]  Language: Albanian  Status: Fulfilled   ID: #[id number]   Name: Lavern Huerta

## 2023-06-28 ENCOUNTER — HOSPITAL ENCOUNTER (OUTPATIENT)
Facility: HOSPITAL | Age: 43
Setting detail: RECURRING SERIES
Discharge: HOME | End: 2023-07-01
Payer: SUBSIDIZED

## 2023-06-28 PROCEDURE — 97535 SELF CARE MNGMENT TRAINING: CPT

## 2023-06-28 PROCEDURE — 97110 THERAPEUTIC EXERCISES: CPT

## 2023-07-05 ENCOUNTER — HOSPITAL ENCOUNTER (OUTPATIENT)
Facility: HOSPITAL | Age: 43
Setting detail: RECURRING SERIES
Discharge: HOME | End: 2023-07-08

## 2023-07-05 PROCEDURE — 97110 THERAPEUTIC EXERCISES: CPT

## 2023-07-05 PROCEDURE — 97140 MANUAL THERAPY 1/> REGIONS: CPT

## 2023-07-05 NOTE — PROGRESS NOTES
with improved performance of HEP and decreased overall pain.       PLAN  YES  Continue plan of care  Re-Cert Due: 24 treatments  [x]  Upgrade activities as tolerated  []  Discharge due to :  []  Other:      Kristen Lyman, PT       7/5/2023       12:13 PM

## 2023-08-03 ENCOUNTER — HOSPITAL ENCOUNTER (OUTPATIENT)
Facility: HOSPITAL | Age: 43
Setting detail: SPECIMEN
Discharge: HOME | End: 2023-08-06

## 2023-08-03 PROCEDURE — 36415 COLL VENOUS BLD VENIPUNCTURE: CPT

## 2023-08-03 PROCEDURE — 87338 HPYLORI STOOL AG IA: CPT

## 2023-08-06 LAB
H PYLORI AG STL QL IA: POSITIVE
SPECIMEN SOURCE: ABNORMAL

## 2023-09-08 ENCOUNTER — HOSPITAL ENCOUNTER (OUTPATIENT)
Facility: HOSPITAL | Age: 43
Setting detail: SPECIMEN
Discharge: HOME | End: 2023-09-11

## 2023-09-08 PROCEDURE — 88305 TISSUE EXAM BY PATHOLOGIST: CPT

## 2023-11-02 PROCEDURE — 87338 HPYLORI STOOL AG IA: CPT

## 2023-11-03 ENCOUNTER — HOSPITAL ENCOUNTER (OUTPATIENT)
Facility: HOSPITAL | Age: 43
Setting detail: SPECIMEN
Discharge: HOME | End: 2023-11-06

## 2023-11-06 LAB
H PYLORI AG STL QL IA: POSITIVE
SPECIMEN SOURCE: ABNORMAL

## 2023-11-16 ENCOUNTER — HOSPITAL ENCOUNTER (OUTPATIENT)
Facility: HOSPITAL | Age: 43
Setting detail: SPECIMEN
Discharge: HOME | End: 2023-11-19

## 2023-11-16 PROCEDURE — 36415 COLL VENOUS BLD VENIPUNCTURE: CPT

## 2023-11-16 PROCEDURE — 85025 COMPLETE CBC W/AUTO DIFF WBC: CPT

## 2023-11-17 LAB
BASOPHILS # BLD: 0 K/UL (ref 0–0.1)
BASOPHILS NFR BLD: 1 % (ref 0–1)
DIFFERENTIAL METHOD BLD: ABNORMAL
EOSINOPHIL # BLD: 0 K/UL (ref 0–0.4)
EOSINOPHIL NFR BLD: 0 % (ref 0–7)
ERYTHROCYTE [DISTWIDTH] IN BLOOD BY AUTOMATED COUNT: 17.4 % (ref 11.5–14.5)
HCT VFR BLD AUTO: 36.5 % (ref 35–47)
HGB BLD-MCNC: 10.9 G/DL (ref 11.5–16)
IMM GRANULOCYTES # BLD AUTO: 0 K/UL (ref 0–0.04)
IMM GRANULOCYTES NFR BLD AUTO: 0 % (ref 0–0.5)
LYMPHOCYTES # BLD: 2.1 K/UL (ref 0.8–3.5)
LYMPHOCYTES NFR BLD: 44 % (ref 12–49)
MCH RBC QN AUTO: 24.8 PG (ref 26–34)
MCHC RBC AUTO-ENTMCNC: 29.9 G/DL (ref 30–36.5)
MCV RBC AUTO: 83 FL (ref 80–99)
MONOCYTES # BLD: 0.3 K/UL (ref 0–1)
MONOCYTES NFR BLD: 7 % (ref 5–13)
NEUTS SEG # BLD: 2.3 K/UL (ref 1.8–8)
NEUTS SEG NFR BLD: 48 % (ref 32–75)
NRBC # BLD: 0 K/UL (ref 0–0.01)
NRBC BLD-RTO: 0 PER 100 WBC
PLATELET # BLD AUTO: 327 K/UL (ref 150–400)
PMV BLD AUTO: 10.5 FL (ref 8.9–12.9)
RBC # BLD AUTO: 4.4 M/UL (ref 3.8–5.2)
WBC # BLD AUTO: 4.8 K/UL (ref 3.6–11)

## 2024-03-18 ENCOUNTER — HOSPITAL ENCOUNTER (OUTPATIENT)
Facility: HOSPITAL | Age: 44
Setting detail: SPECIMEN
Discharge: HOME | End: 2024-03-21

## 2024-03-18 PROCEDURE — 87798 DETECT AGENT NOS DNA AMP: CPT

## 2024-03-18 PROCEDURE — 87801 DETECT AGNT MULT DNA AMPLI: CPT

## 2024-03-21 LAB
A VAGINAE DNA VAG QL NAA+PROBE: NORMAL SCORE
BVAB2 DNA VAG QL NAA+PROBE: NORMAL SCORE
C ALBICANS DNA VAG QL NAA+PROBE: NEGATIVE
C GLABRATA DNA VAG QL NAA+PROBE: NEGATIVE
MEGA1 DNA VAG QL NAA+PROBE: NORMAL SCORE
SPECIMEN SOURCE: NORMAL

## 2024-05-09 ENCOUNTER — HOSPITAL ENCOUNTER (OUTPATIENT)
Dept: VASCULAR SURGERY | Facility: HOSPITAL | Age: 44
Discharge: HOME | End: 2024-05-11
Payer: SUBSIDIZED

## 2024-05-09 DIAGNOSIS — M79.622 PAIN IN LEFT UPPER ARM: ICD-10-CM

## 2024-05-09 LAB
VAS LEFT AX A MID PSV: 78 CM/S
VAS LEFT BRACHIAL A DIST EDV: 12.3 CM/S
VAS LEFT BRACHIAL A DIST PSV: 132.9 CM/S
VAS LEFT BRACHIAL A PROX EDV: 10.1 CM/S
VAS LEFT BRACHIAL A PROX PSV: 130.8 CM/S
VAS LEFT RADIAL A DIST PSV: 62.5 CM/S
VAS LEFT RADIAL A PROX PSV: 60.4 CM/S
VAS LEFT SUBCLAVIAN DIST PSV: 111 CM/S
VAS LEFT SUBCLAVIAN PROX PSV: 148 CM/S
VAS LEFT ULNAR A DIST PSV: 69 CM/S
VAS LEFT ULNAR A PROX PSV: 71.2 CM/S

## 2024-05-09 PROCEDURE — 93931 UPPER EXTREMITY STUDY: CPT

## 2024-05-23 ENCOUNTER — HOSPITAL ENCOUNTER (OUTPATIENT)
Facility: HOSPITAL | Age: 44
Setting detail: SPECIMEN
Discharge: HOME | End: 2024-05-26

## 2024-05-23 PROCEDURE — 87338 HPYLORI STOOL AG IA: CPT

## 2024-05-26 LAB
H PYLORI AG STL QL IA: NEGATIVE
SPECIMEN SOURCE: NORMAL

## 2024-08-08 ENCOUNTER — HOSPITAL ENCOUNTER (OUTPATIENT)
Facility: HOSPITAL | Age: 44
Discharge: HOME | End: 2024-08-08

## 2024-08-08 DIAGNOSIS — M54.12 RADICULOPATHY, CERVICAL REGION: ICD-10-CM

## 2024-08-08 PROCEDURE — 72141 MRI NECK SPINE W/O DYE: CPT

## 2025-03-06 ENCOUNTER — HOSPITAL ENCOUNTER (OUTPATIENT)
Facility: HOSPITAL | Age: 45
Setting detail: SPECIMEN
Discharge: HOME | End: 2025-03-09

## 2025-03-06 PROCEDURE — 87798 DETECT AGENT NOS DNA AMP: CPT

## 2025-03-06 PROCEDURE — 87491 CHLMYD TRACH DNA AMP PROBE: CPT

## 2025-03-06 PROCEDURE — 87591 N.GONORRHOEAE DNA AMP PROB: CPT

## 2025-03-06 PROCEDURE — 87801 DETECT AGNT MULT DNA AMPLI: CPT

## 2025-03-06 PROCEDURE — 36415 COLL VENOUS BLD VENIPUNCTURE: CPT

## 2025-03-06 PROCEDURE — 87661 TRICHOMONAS VAGINALIS AMPLIF: CPT

## 2025-03-13 LAB
A VAGINAE DNA VAG QL NAA+PROBE: NORMAL SCORE
BVAB2 DNA VAG QL NAA+PROBE: NORMAL SCORE
C ALBICANS DNA VAG QL NAA+PROBE: NEGATIVE
C GLABRATA DNA VAG QL NAA+PROBE: NEGATIVE
C TRACH RRNA SPEC QL NAA+PROBE: NEGATIVE
CANDIDA KRUSEI: NEGATIVE
CANDIDA LUSITANIAE, NAA: NEGATIVE
CANDIDA PARAPSILOSIS/TROPICALIS: NEGATIVE
MEGA1 DNA VAG QL NAA+PROBE: NORMAL SCORE
N GONORRHOEA RRNA SPEC QL NAA+PROBE: NEGATIVE
T VAGINALIS RRNA SPEC QL NAA+PROBE: NEGATIVE

## 2025-04-03 ENCOUNTER — TRANSCRIBE ORDERS (OUTPATIENT)
Facility: HOSPITAL | Age: 45
End: 2025-04-03

## 2025-04-03 ENCOUNTER — HOSPITAL ENCOUNTER (OUTPATIENT)
Facility: HOSPITAL | Age: 45
End: 2025-04-03

## 2025-04-03 ENCOUNTER — HOSPITAL ENCOUNTER (OUTPATIENT)
Facility: HOSPITAL | Age: 45
Discharge: HOME | End: 2025-04-03

## 2025-04-03 DIAGNOSIS — M54.17 LUMBOSACRAL RADICULOPATHY: ICD-10-CM

## 2025-04-03 DIAGNOSIS — M25.561 PAIN IN JOINT OF RIGHT KNEE: Primary | ICD-10-CM

## 2025-04-03 DIAGNOSIS — M25.561 PAIN IN JOINT OF RIGHT KNEE: ICD-10-CM

## 2025-04-03 DIAGNOSIS — M54.17 LUMBOSACRAL RADICULOPATHY: Primary | ICD-10-CM

## 2025-04-03 PROCEDURE — 73564 X-RAY EXAM KNEE 4 OR MORE: CPT

## 2025-04-03 PROCEDURE — 72110 X-RAY EXAM L-2 SPINE 4/>VWS: CPT

## 2025-07-15 ENCOUNTER — HOSPITAL ENCOUNTER (OUTPATIENT)
Facility: HOSPITAL | Age: 45
Setting detail: SPECIMEN
Discharge: HOME | End: 2025-07-18

## 2025-07-15 PROCEDURE — 87798 DETECT AGENT NOS DNA AMP: CPT

## 2025-07-15 PROCEDURE — 87591 N.GONORRHOEAE DNA AMP PROB: CPT

## 2025-07-15 PROCEDURE — 87661 TRICHOMONAS VAGINALIS AMPLIF: CPT

## 2025-07-15 PROCEDURE — 87801 DETECT AGNT MULT DNA AMPLI: CPT

## 2025-07-15 PROCEDURE — 87491 CHLMYD TRACH DNA AMP PROBE: CPT
